# Patient Record
Sex: FEMALE | Race: WHITE | NOT HISPANIC OR LATINO | Employment: FULL TIME | URBAN - METROPOLITAN AREA
[De-identification: names, ages, dates, MRNs, and addresses within clinical notes are randomized per-mention and may not be internally consistent; named-entity substitution may affect disease eponyms.]

---

## 2017-01-05 ENCOUNTER — TELEPHONE (OUTPATIENT)
Dept: OBSTETRICS AND GYNECOLOGY | Facility: CLINIC | Age: 27
End: 2017-01-05

## 2017-01-05 NOTE — TELEPHONE ENCOUNTER
Dr. Baldwin pt's  calling, states that pt has been experiencing heartburn for the last two weeks and would like to know what she can take. Please call Anish back at [phone].

## 2017-01-05 NOTE — TELEPHONE ENCOUNTER
28 2/7 week OB c/o heartburn.  Pepcid is not helping, asking if she can take Ranitidine.  Reassured him that it was safe in pregnancy.

## 2017-01-18 ENCOUNTER — ROUTINE PRENATAL (OUTPATIENT)
Dept: OBSTETRICS AND GYNECOLOGY | Facility: CLINIC | Age: 27
End: 2017-01-18
Attending: OBSTETRICS & GYNECOLOGY
Payer: COMMERCIAL

## 2017-01-18 VITALS — SYSTOLIC BLOOD PRESSURE: 102 MMHG | DIASTOLIC BLOOD PRESSURE: 74 MMHG | BODY MASS INDEX: 43.5 KG/M2 | WEIGHT: 237.88 LBS

## 2017-01-18 DIAGNOSIS — Z34.80 SUPERVISION OF OTHER NORMAL PREGNANCY: Primary | ICD-10-CM

## 2017-01-18 PROCEDURE — 0502F SUBSEQUENT PRENATAL CARE: CPT | Mod: S$GLB,,, | Performed by: OBSTETRICS & GYNECOLOGY

## 2017-01-18 PROCEDURE — 99999 PR PBB SHADOW E&M-EST. PATIENT-LVL II: CPT | Mod: PBBFAC,,, | Performed by: OBSTETRICS & GYNECOLOGY

## 2017-01-18 RX ORDER — FAMOTIDINE 20 MG/1
20 TABLET, FILM COATED ORAL NIGHTLY PRN
Qty: 60 TABLET | Refills: 1 | Status: ON HOLD | OUTPATIENT
Start: 2017-01-18 | End: 2017-03-24 | Stop reason: HOSPADM

## 2017-01-18 NOTE — MR AVS SNAPSHOT
[facility]  [address]  [address]  [facility] [address]  Phone: [phone]  Fax: [phone]                  Jyotsna Moss   2017 9:30 AM   Routine Prenatal    Description:  Female : 1990   Provider:  Alida Baldwin MD   Department:  [facility]           Reason for Visit     Routine Prenatal Visit                To Do List           Future Appointments        Provider Department Dept Phone    2/3/2017 9:45 AM Alida Baldwin MD [facility] [phone]    2017 9:45 AM Alida Baldwin MD [facility] [phone]    2017 9:45 AM Alida Baldwin MD [facility] [phone]      Goals (5 Years of Data)     None       These Medications        Disp Refills Start End    famotidine (PEPCID) 20 MG tablet 60 tablet 1 2017    Take 1 tablet (20 mg total) by mouth nightly as needed for Heartburn. - Oral    Pharmacy: [address] #: [phone]         [facility] On Call     [facility] On Call Nurse Care Line -  Assistance  Registered nurses in the [facility] On Call Center provide clinical advisement, health education, appointment booking, and other advisory services.  Call for this free service at [phone].             Medications           Message regarding Medications     Verify the changes and/or additions to your medication regime listed below are the same as discussed with your clinician today.  If any of these changes or additions are incorrect, please notify your healthcare provider.        START taking these NEW medications        Refills    famotidine (PEPCID) 20 MG tablet 1    Sig: Take 1 tablet (20 mg total) by mouth nightly as needed for Heartburn.    Class: Normal    Route: Oral           Verify that the below list of medications is an accurate representation of the medications you are currently taking.  If none reported, the list  may be blank. If incorrect, please contact your healthcare provider. Carry this list with you in case of emergency.           Current Medications     doxylamine-pyridoxine (DICLEGIS) 10-10 mg TbEC Take 2 tablets by mouth once daily.    famotidine (PEPCID) 20 MG tablet Take 1 tablet (20 mg total) by mouth nightly as needed for Heartburn.           Clinical Reference Information           Prenatal Vitals     Enc. Date GA Prenatal Vitals Prenatal Pulse Pain Level Urine Albumin/Glucose Edema Presentation Dilation/Effacement/Station    1/18/17 30w1d 102/74 / 107.9 kg (237 lb 13.7 oz)           12/30/16 27w3d 102/60 / 107.8 kg (237 lb 10.5 oz) 27 cm / 140 / Present  0 Negative / Negative None / None      12/14/16 25w1d 116/78 / 106 kg (233 lb 11 oz) 25 cm / 134 / Present  0 Negative / Negative None / None / None      11/16/16 21w1d 114/76 / 105 kg (231 lb 9.5 oz)  /  / Present  0 Negative / Negative None / None / None      10/27/16 18w2d 106/72 / 104 kg (229 lb 4.5 oz)  / 148 / Present  0 Negative / Negative None / None / None      10/7/16 15w3d 100/70 / 102.4 kg (225 lb 12 oz)  / + / Absent  0 Negative / Negative None / None / None      9/7/16 11w1d 112/70 / 102.1 kg (225 lb 1.4 oz)  / 145 / Absent  0 Negative / Negative None / None      8/19/16 9w5d 122/80 / 103.1 kg (227 lb 6.5 oz)  / 140 / Absent  0 Negative / Negative None / None         Number of fetuses: 1       Vital Signs - Last Recorded  Most recent update: 1/18/2017  9:56 AM by Ely Fowler MA    BP Wt LMP BMI       102/74 107.9 kg (237 lb 13.7 oz) 06/12/2016 (Exact Date) 43.5 kg/m2       Allergies as of 1/18/2017     No Known Allergies      Immunizations Administered on Date of Encounter - 1/18/2017     None

## 2017-02-03 ENCOUNTER — ROUTINE PRENATAL (OUTPATIENT)
Dept: OBSTETRICS AND GYNECOLOGY | Facility: CLINIC | Age: 27
End: 2017-02-03
Attending: OBSTETRICS & GYNECOLOGY
Payer: COMMERCIAL

## 2017-02-03 VITALS
WEIGHT: 243.63 LBS | DIASTOLIC BLOOD PRESSURE: 68 MMHG | BODY MASS INDEX: 44.56 KG/M2 | SYSTOLIC BLOOD PRESSURE: 114 MMHG

## 2017-02-03 DIAGNOSIS — O26.841 SIZE OF FETUS INCONSISTENT WITH DATES IN FIRST TRIMESTER: Primary | ICD-10-CM

## 2017-02-03 PROCEDURE — 0502F SUBSEQUENT PRENATAL CARE: CPT | Mod: S$GLB,,, | Performed by: OBSTETRICS & GYNECOLOGY

## 2017-02-03 PROCEDURE — 99999 PR PBB SHADOW E&M-EST. PATIENT-LVL II: CPT | Mod: PBBFAC,,, | Performed by: OBSTETRICS & GYNECOLOGY

## 2017-02-03 NOTE — MR AVS SNAPSHOT
[facility]  [address]  [address]  [facility] [address]  Phone: [phone]  Fax: [phone]                  Jyotsna Moss   2/3/2017 9:45 AM   Routine Prenatal    Description:  Female : 1990   Provider:  Alida Baldwin MD   [facility]  [facility]           Reason for Visit     Routine Prenatal Visit           Diagnoses this Visit        Comments    Size of fetus inconsistent with dates in first trimester    -  Primary            To Do List           Future Appointments        Provider Department Dept Phone    2017 8:40 AM [facility], WOMEN'S ULTRASOUND [facility] [phone]    2017 9:45 AM Alida Baldwin MD [facility] [phone]    2017 9:45 AM Alida Baldwin MD [facility] [phone]    3/3/2017 10:00 AM Ailda Baldwin MD [facility] [phone]    3/10/2017 1:00 PM Alida Baldwin MD [facility] [phone]      Goals (5 Years of Data)     None      [facility] On Call     [facility] On Call Nurse [facility] -  Assistance  Registered nurses in the [facility] On Call Center provide clinical advisement, health education, appointment booking, and other advisory services.  Call for this free service at [phone].             Medications           Message regarding Medications     Verify the changes and/or additions to your medication regime listed below are the same as discussed with your clinician today.  If any of these changes or additions are incorrect, please notify your healthcare provider.             Verify that the below list of medications is an accurate representation of the medications you are currently taking.  If none reported, the list may be blank. If incorrect, please contact your healthcare provider. Carry this list with you in case of emergency.           Current Medications     doxylamine-pyridoxine (DICLEGIS) 10-10 mg TbEC Take 2 tablets by mouth once daily.     famotidine (PEPCID) 20 MG tablet Take 1 tablet (20 mg total) by mouth nightly as needed for Heartburn.           Clinical Reference Information           Prenatal Vitals     Enc. Date GA Prenatal Vitals Prenatal Pulse Pain Level Urine Albumin/Glucose Edema Presentation Dilation/Effacement/Station    2/3/17 32w3d 114/68 / 110.5 kg (243 lb 9.7 oz) 33 cm / 150  0 Negative / Negative       1/18/17 30w1d 102/74 / 107.9 kg (237 lb 13.7 oz) 32 cm / 145 / Present  0 Negative / Negative None / None      12/30/16 27w3d 102/60 / 107.8 kg (237 lb 10.5 oz) 27 cm / 140 / Present  0 Negative / Negative None / None      12/14/16 25w1d 116/78 / 106 kg (233 lb 11 oz) 25 cm / 134 / Present  0 Negative / Negative None / None / None      11/16/16 21w1d 114/76 / 105 kg (231 lb 9.5 oz)  /  / Present  0 Negative / Negative None / None / None      10/27/16 18w2d 106/72 / 104 kg (229 lb 4.5 oz)  / 148 / Present  0 Negative / Negative None / None / None      10/7/16 15w3d 100/70 / 102.4 kg (225 lb 12 oz)  / + / Absent  0 Negative / Negative None / None / None      9/7/16 11w1d 112/70 / 102.1 kg (225 lb 1.4 oz)  / 145 / Absent  0 Negative / Negative None / None      8/19/16 9w5d 122/80 / 103.1 kg (227 lb 6.5 oz)  / 140 / Absent  0 Negative / Negative None / None         Number of fetuses: 1       Your Vitals Were     BP Weight Last Period BMI       114/68 110.5 kg (243 lb 9.7 oz) 06/12/2016 (Exact Date) 44.56 kg/m2       Allergies as of 2/3/2017     No Known Allergies      Immunizations Administered on Date of Encounter - 2/3/2017     None      Orders Placed During Today's Visit     Future Labs/Procedures Expected by Expires    US OB/GYN Procedure ([facility]point) - Extended List - Future  As directed 2/3/2018      Language Assistance Services     ATTENTION: Language assistance services are available, free of charge. Please call [phone].      ATENCIÓN: Si habla español, tiene a spain disposición servicios gratuitos de asistencia lingüística.  Marlene nair [phone].     DENEEN Ý: N?u b?n nói Ti?ng Vi?t, có các d?ch v? h? tr? ngôn ng? mi?n phí dành cho b?n. G?i s? [phone].         [facility] complies with applicable Federal civil rights laws and does not discriminate on the basis of race, color, national origin, age, disability, or sex.

## 2017-02-17 ENCOUNTER — PROCEDURE VISIT (OUTPATIENT)
Dept: OBSTETRICS AND GYNECOLOGY | Facility: CLINIC | Age: 27
End: 2017-02-17
Payer: COMMERCIAL

## 2017-02-17 ENCOUNTER — ROUTINE PRENATAL (OUTPATIENT)
Dept: OBSTETRICS AND GYNECOLOGY | Facility: CLINIC | Age: 27
End: 2017-02-17
Attending: OBSTETRICS & GYNECOLOGY
Payer: COMMERCIAL

## 2017-02-17 VITALS
WEIGHT: 246.13 LBS | SYSTOLIC BLOOD PRESSURE: 112 MMHG | DIASTOLIC BLOOD PRESSURE: 68 MMHG | BODY MASS INDEX: 45.02 KG/M2

## 2017-02-17 DIAGNOSIS — Z36.85 ANTENATAL SCREENING FOR STREPTOCOCCUS B: Primary | ICD-10-CM

## 2017-02-17 DIAGNOSIS — O26.841 SIZE OF FETUS INCONSISTENT WITH DATES IN FIRST TRIMESTER: ICD-10-CM

## 2017-02-17 DIAGNOSIS — Z34.80 SUPERVISION OF OTHER NORMAL PREGNANCY: ICD-10-CM

## 2017-02-17 PROCEDURE — 76819 FETAL BIOPHYS PROFIL W/O NST: CPT | Mod: S$GLB,,, | Performed by: PEDIATRICS

## 2017-02-17 PROCEDURE — 87081 CULTURE SCREEN ONLY: CPT

## 2017-02-17 PROCEDURE — 99999 PR PBB SHADOW E&M-EST. PATIENT-LVL III: CPT | Mod: PBBFAC,,, | Performed by: OBSTETRICS & GYNECOLOGY

## 2017-02-17 PROCEDURE — 0502F SUBSEQUENT PRENATAL CARE: CPT | Mod: S$GLB,,, | Performed by: OBSTETRICS & GYNECOLOGY

## 2017-02-17 PROCEDURE — 76816 OB US FOLLOW-UP PER FETUS: CPT | Mod: S$GLB,,, | Performed by: PEDIATRICS

## 2017-02-17 NOTE — MR AVS SNAPSHOT
[facility]  [address]  [address]  [facility] [address]  Phone: [phone]  Fax: [phone]                  Jyotsna Moss   2017 9:45 AM   Routine Prenatal    Description:  Female : 1990   Provider:  Alida Baldwin MD   [facility]                To Do List           Future Appointments        Provider Department Dept Phone    2017 9:45 AM [facility] [phone]    3/3/2017 10:00 AM Alida Baldwin MD [facility] [phone]    3/10/2017 1:00 PM Alida Baldwin MD [facility] [phone]    3/17/2017 9:45 AM Antoinette Mckeon MD [facility] [phone]    3/24/2017 9:45 AM Alida Baldwin MD [facility] [phone]      Goals (5 Years of Data)     None      [facility] On Call     [facility] On Call Nurse [facility] -  Assistance  Registered nurses in the [facility] On Call Center provide clinical advisement, health education, appointment booking, and other advisory services.  Call for this free service at [phone].             Medications           Message regarding Medications     Verify the changes and/or additions to your medication regime listed below are the same as discussed with your clinician today.  If any of these changes or additions are incorrect, please notify your healthcare provider.             Verify that the below list of medications is an accurate representation of the medications you are currently taking.  If none reported, the list may be blank. If incorrect, please contact your healthcare provider. Carry this list with you in case of emergency.           Current Medications     doxylamine-pyridoxine (DICLEGIS) 10-10 mg TbEC Take 2 tablets by mouth once daily.    famotidine (PEPCID) 20 MG tablet Take 1 tablet (20 mg total) by mouth nightly as needed for Heartburn.           Clinical Reference Information           Prenatal Vitals      Enc. Date GA Prenatal Vitals Prenatal Pulse Pain Level Urine Albumin/Glucose Edema Presentation Dilation/Effacement/Station    2/17/17 34w3d 112/68 / 111.7 kg (246 lb 2.3 oz)    Negative / Negative       2/3/17 32w3d 114/68 / 110.5 kg (243 lb 9.7 oz) 34 cm / 150 / Present  0 Negative / Negative None / None      1/18/17 30w1d 102/74 / 107.9 kg (237 lb 13.7 oz) 32 cm / 145 / Present  0 Negative / Negative None / None      12/30/16 27w3d 102/60 / 107.8 kg (237 lb 10.5 oz) 27 cm / 140 / Present  0 Negative / Negative None / None      12/14/16 25w1d 116/78 / 106 kg (233 lb 11 oz) 25 cm / 134 / Present  0 Negative / Negative None / None / None      11/16/16 21w1d 114/76 / 105 kg (231 lb 9.5 oz)  /  / Present  0 Negative / Negative None / None / None      10/27/16 18w2d 106/72 / 104 kg (229 lb 4.5 oz)  / 148 / Present  0 Negative / Negative None / None / None      10/7/16 15w3d 100/70 / 102.4 kg (225 lb 12 oz)  / + / Absent  0 Negative / Negative None / None / None      9/7/16 11w1d 112/70 / 102.1 kg (225 lb 1.4 oz)  / 145 / Absent  0 Negative / Negative None / None      8/19/16 9w5d 122/80 / 103.1 kg (227 lb 6.5 oz)  / 140 / Absent  0 Negative / Negative None / None         Number of fetuses: 1       Your Vitals Were     BP Weight Last Period BMI       112/68 111.7 kg (246 lb 2.3 oz) 06/12/2016 (Exact Date) 45.02 kg/m2       Allergies as of 2/17/2017     No Known Allergies      Immunizations Administered on Date of Encounter - 2/17/2017     None      Language Assistance Services     ATTENTION: Language assistance services are available, free of charge. Please call [phone].      ATENCIÓN: Si habla merline, tiene a spain disposición servicios gratuitos de asistencia lingüística. Llame al [phone].     DENEEN Ý: N?u b?n nói Ti?ng Vi?t, có các d?ch v? h? tr? ngôn ng? mi?n phí dành cho b?n. G?i s? [phone].         [facility] complies with applicable Federal civil rights laws and does not discriminate  on the basis of race, color, national origin, age, disability, or sex.

## 2017-02-17 NOTE — PROGRESS NOTES
> 95 percent for baby   Mode of delivery discussed   Repeat growth at 37 weeks  Healthy eating discussed

## 2017-02-17 NOTE — MR AVS SNAPSHOT
[facility]  [address]  [address]  [facility] [address]  Phone: [phone]  Fax: [phone]                  Jyotsna Moss   2017 8:40 AM   Procedure visit    Description:  Female : 1990   Provider:  [facility], WOMEN'S ULTRASOUND   Department:  [facility]           Diagnoses this Visit        Comments    Size of fetus inconsistent with dates in first trimester                To Do List           Future Appointments        Provider Department Dept Phone    2017 9:45 AM Alida Baldwin MD [facility] [phone]    3/3/2017 10:00 AM Alida Baldwin MD [facility] [phone]    3/10/2017 1:00 PM Alida Baldwin MD [facility] [phone]    3/17/2017 9:45 AM Antoinette Mckeon MD [facility] [phone]    3/24/2017 9:45 AM lAida Baldwin MD [facility] [phone]      Goals (5 Years of Data)     None      [facility] On Call     [facility] On [facility] Nurse [facility] -  Assistance  Registered nurses in the [facility] On Call Center provide clinical advisement, health education, appointment booking, and other advisory services.  Call for this free service at [phone].             Medications           Message regarding Medications     Verify the changes and/or additions to your medication regime listed below are the same as discussed with your clinician today.  If any of these changes or additions are incorrect, please notify your healthcare provider.             Verify that the below list of medications is an accurate representation of the medications you are currently taking.  If none reported, the list may be blank. If incorrect, please contact your healthcare provider. Carry this list with you in case of emergency.           Current Medications     doxylamine-pyridoxine (DICLEGIS) 10-10 mg TbEC Take 2 tablets by mouth once daily.    famotidine (PEPCID) 20 MG tablet Take 1 tablet (20 mg total)  by mouth nightly as needed for Heartburn.           Clinical Reference Information           Prenatal Vitals     Enc. Date GA Prenatal Vitals Prenatal Pulse Pain Level Urine Albumin/Glucose Edema Presentation Dilation/Effacement/Station    2/17/17 34w3d 112/68 / 111.7 kg (246 lb 2.3 oz)    Negative / Negative       2/3/17 32w3d 114/68 / 110.5 kg (243 lb 9.7 oz) 34 cm / 150 / Present  0 Negative / Negative None / None      1/18/17 30w1d 102/74 / 107.9 kg (237 lb 13.7 oz) 32 cm / 145 / Present  0 Negative / Negative None / None      12/30/16 27w3d 102/60 / 107.8 kg (237 lb 10.5 oz) 27 cm / 140 / Present  0 Negative / Negative None / None      12/14/16 25w1d 116/78 / 106 kg (233 lb 11 oz) 25 cm / 134 / Present  0 Negative / Negative None / None / None      11/16/16 21w1d 114/76 / 105 kg (231 lb 9.5 oz)  /  / Present  0 Negative / Negative None / None / None      10/27/16 18w2d 106/72 / 104 kg (229 lb 4.5 oz)  / 148 / Present  0 Negative / Negative None / None / None      10/7/16 15w3d 100/70 / 102.4 kg (225 lb 12 oz)  / + / Absent  0 Negative / Negative None / None / None      9/7/16 11w1d 112/70 / 102.1 kg (225 lb 1.4 oz)  / 145 / Absent  0 Negative / Negative None / None      8/19/16 9w5d 122/80 / 103.1 kg (227 lb 6.5 oz)  / 140 / Absent  0 Negative / Negative None / None         Number of fetuses: 1       Your Vitals Were     Last Period                   06/12/2016 (Exact Date)           Allergies as of 2/17/2017     No Known Allergies      Immunizations Administered on Date of Encounter - 2/17/2017     None      Orders Placed During Today's Visit      Normal Orders This Visit    US OB/GYN Procedure (Viewpoint) - Extended List - Future       Language Assistance Services     ATTENTION: Language assistance services are available, free of charge. Please call [phone].      ATENCIÓN: Si habla español, tiene a spain disposición servicios gratuitos de asistencia lingüística. Llame al [phone].     [facility] Ý: N?u b?n  nói Ti?ng Vi?t, có các d?ch v? h? tr? ngôn ng? mi?n phí dành cho b?n. G?i s? [phone].         [facility] complies with applicable Federal civil rights laws and does not discriminate on the basis of race, color, national origin, age, disability, or sex.

## 2017-02-17 NOTE — PROCEDURES
Procedures       Indication: follow-up for fetal growth (MANISH AMADOR).   Maternal age (26 years).   ____________________________________________________________________________  History: Age: 26 years. : 2 Para: 1. LMP not sure.   Obstetric History: Mode of last delivery: Vaginal Delivery @ 40 weeks.  ____________________________________________________________________________  Dating:    LMP: 16 EDC: 17 GA by LMP: 35w5d  Previous Scan on: 08/10/16 EDC: 17 GA by prev. scan: 34w3d  Current Scan on: 17 EDC: 17 GA by current scan: 36w6d      Best Overall Assessment: 17 EDC: 17 Assessed GA: 34w3d  The calculation of the gestational age by current scan was based on BPD, HC, AC and FL.  The Best Overall Assessment is based on the ultrasound examination on 08/10/16.  ____________________________________________________________________________  General Evaluation:    Fetal heart activity: present. Fetal heart rate: 152 bpm.   Presentation: unstable lie.   Fetal movement: visible.   Amniotic Fluid: normal. Maximal vertical pocket 7.0 cm.   Cord: 3 vessels.   Placenta: anterior.     ____________________________________________________________________________  Growth Scan:  Siegel gestation.      Biometry:    BPD 87.9 mm 79th% 35w4d (34w3d to 36w4d)  .0 mm >95th% 39w3d (36w5d to 42w1d)  .0 mm >95th% 38w0d (37w0d to 39w0d)  FL 67.0 mm 42nd% 34w3d (31w4d to 37w3d)  CM 6.5 mm 24th%  EFW (lbs/oz) 6 lbs 13 ozs  EFW (g) 3098 g  >95th%       Fetal Anatomy:    Visualized with normal appearance: head, brain, chest, gastrointestinal tract, kidneys, bladder.  Not examined: neck, skin.  Face: profile sub-opt, nose sub-opt, lip sub-opt. Sub-opt today but prev seen wnl.  Spine: sub-opt today but prev seen wnl.  Heart: Visualized and normal appearance: four-chamber view.   Angle: to the fetal left. Four-chamber view: septum is sub-opt, prev seen wnl. Left outflow tract: sub-opt,  prev seen wnl. Right outflow tract: sub-opt, prev seen wnl. Aortic arch: Not ascertained.   Aorta suboptimal but prev seen wnl.  Abdominal Wall: Sub-opt today but prev seen wnl.  Genitalia: sub-opt today but prev seen boy.   Extremities: 4 limbs. Previously seen plantar surface of the feet wnl, previously seen open hands.      Summary of Ultrasound Findings:  Transabdominal US. U/S machine: [id number] [place] 520. U/S view: limited by late gestational age.       Impression: The fetal size is greater than dates suggest.    Comment: No fetal abnormalities are seen.    ____________________________________________________________________________  Fetal Wellbeing Assessment:    Amniotic fluid: normal. MVP: 7.0 cm.     Biophysical Profile: Fetal body movements: normal (2), Fetal tone: normal (2), Fetal breathing movements: normal (2), Amniotic fluid volume: normal (2). Score 8 / 8.     Summary: BPP normal at 8 of 8.  [facility]    ____________________________________________________________________________  Report Summary:      Impression:     Limited anatomy was negative.  No anomalies seen.  AFV normal.     The biometry obtained on today's scan is consistent with a macrosomic growth profile.     Recommendations:     Suggest repeat scan as you feel clinically indicated.

## 2017-02-20 LAB — BACTERIA SPEC AEROBE CULT: NORMAL

## 2017-02-23 ENCOUNTER — ROUTINE PRENATAL (OUTPATIENT)
Dept: OBSTETRICS AND GYNECOLOGY | Facility: CLINIC | Age: 27
End: 2017-02-23
Attending: OBSTETRICS & GYNECOLOGY
Payer: COMMERCIAL

## 2017-02-23 ENCOUNTER — LAB VISIT (OUTPATIENT)
Dept: LAB | Facility: HOSPITAL | Age: 27
End: 2017-02-23
Payer: COMMERCIAL

## 2017-02-23 VITALS — DIASTOLIC BLOOD PRESSURE: 70 MMHG | SYSTOLIC BLOOD PRESSURE: 122 MMHG | WEIGHT: 244.94 LBS | BODY MASS INDEX: 44.8 KG/M2

## 2017-02-23 DIAGNOSIS — Z3A.35 35 WEEKS GESTATION OF PREGNANCY: ICD-10-CM

## 2017-02-23 DIAGNOSIS — O99.213 OBESITY IN PREGNANCY, ANTEPARTUM, THIRD TRIMESTER: ICD-10-CM

## 2017-02-23 DIAGNOSIS — Z34.90 GRAVIDA 2, CURRENTLY PREGNANT: ICD-10-CM

## 2017-02-23 DIAGNOSIS — O36.63X0: ICD-10-CM

## 2017-02-23 DIAGNOSIS — Z3A.35 35 WEEKS GESTATION OF PREGNANCY: Primary | ICD-10-CM

## 2017-02-23 PROBLEM — O36.60X0 EXCESSIVE FETAL GROWTH, LARGE FOR DATES, ANTEPARTUM: Status: ACTIVE | Noted: 2017-02-23

## 2017-02-23 LAB
ERYTHROCYTE [DISTWIDTH] IN BLOOD BY AUTOMATED COUNT: 13.1 %
HCT VFR BLD AUTO: 34.4 %
HGB BLD-MCNC: 11.4 G/DL
MCH RBC QN AUTO: 30.2 PG
MCHC RBC AUTO-ENTMCNC: 33.1 %
MCV RBC AUTO: 91 FL
PLATELET # BLD AUTO: 219 K/UL
PMV BLD AUTO: 10.3 FL
RBC # BLD AUTO: 3.78 M/UL
WBC # BLD AUTO: 8.36 K/UL

## 2017-02-23 PROCEDURE — 0502F SUBSEQUENT PRENATAL CARE: CPT | Mod: S$GLB,,, | Performed by: NURSE PRACTITIONER

## 2017-02-23 PROCEDURE — 86703 HIV-1/HIV-2 1 RESULT ANTBDY: CPT

## 2017-02-23 PROCEDURE — 86592 SYPHILIS TEST NON-TREP QUAL: CPT

## 2017-02-23 PROCEDURE — 99999 PR PBB SHADOW E&M-EST. PATIENT-LVL III: CPT | Mod: PBBFAC,,, | Performed by: NURSE PRACTITIONER

## 2017-02-23 PROCEDURE — 85027 COMPLETE CBC AUTOMATED: CPT

## 2017-02-23 NOTE — MR AVS SNAPSHOT
[facility]  [address]  [place] [address]  Phone: [phone]  Fax: [phone]                  Jyotsna Moss   2017 10:20 AM   Routine Prenatal    Description:  Female : 1990   Provider:  Juanita Bob NP   Department:  [facility]           Reason for Visit     Routine Prenatal Visit           Diagnoses this Visit        Comments    35 weeks gestation of pregnancy    -  Primary      2, currently pregnant                To Do List           Future Appointments        Provider Department Dept Phone    2017 11:10 AM [facility], [facility]     3/3/2017 10:00 AM Alida Baldwin MD General acute hospital [phone]    3/10/2017 1:00 PM Alida Baldwin MD General acute hospital [phone]    3/17/2017 9:45 AM Antoinette Mckeon MD General acute hospital [phone]    3/24/2017 9:45 AM Alida Baldwin MD General acute hospital [phone]      Goals (5 Years of Data)     None      [facility] On Call     [facility] On Call Nurse [facility] -  Assistance  Registered nurses in the [facility] On Call Center provide clinical advisement, health education, appointment booking, and other advisory services.  Call for this free service at [phone].             Medications           Message regarding Medications     Verify the changes and/or additions to your medication regime listed below are the same as discussed with your clinician today.  If any of these changes or additions are incorrect, please notify your healthcare provider.             Verify that the below list of medications is an accurate representation of the medications you are currently taking.  If none reported, the list may be blank. If incorrect, please contact your healthcare provider. Carry this list with you in case of emergency.           Current Medications     doxylamine-pyridoxine (DICLEGIS) 10-10 mg TbEC Take 2 tablets by mouth once  daily.    famotidine (PEPCID) 20 MG tablet Take 1 tablet (20 mg total) by mouth nightly as needed for Heartburn.           Clinical Reference Information           Prenatal Vitals     Enc. Date GA Prenatal Vitals Prenatal Pulse Pain Level Urine Albumin/Glucose Edema Presentation Dilation/Effacement/Station    2/23/17 35w2d 122/70 / 111.1 kg (244 lb 14.9 oz)  /  / Present  0  None / None      2/17/17 34w3d 112/68 / 111.7 kg (246 lb 2.3 oz) 37 cm / 151 / Present  0 Negative / Negative None / None  0    2/3/17 32w3d 114/68 / 110.5 kg (243 lb 9.7 oz) 34 cm / 150 / Present  0 Negative / Negative None / None      1/18/17 30w1d 102/74 / 107.9 kg (237 lb 13.7 oz) 32 cm / 145 / Present  0 Negative / Negative None / None      12/30/16 27w3d 102/60 / 107.8 kg (237 lb 10.5 oz) 27 cm / 140 / Present  0 Negative / Negative None / None      12/14/16 25w1d 116/78 / 106 kg (233 lb 11 oz) 25 cm / 134 / Present  0 Negative / Negative None / None / None      11/16/16 21w1d 114/76 / 105 kg (231 lb 9.5 oz)  /  / Present  0 Negative / Negative None / None / None      10/27/16 18w2d 106/72 / 104 kg (229 lb 4.5 oz)  / 148 / Present  0 Negative / Negative None / None / None      10/7/16 15w3d 100/70 / 102.4 kg (225 lb 12 oz)  / + / Absent  0 Negative / Negative None / None / None      9/7/16 11w1d 112/70 / 102.1 kg (225 lb 1.4 oz)  / 145 / Absent  0 Negative / Negative None / None      8/19/16 9w5d 122/80 / 103.1 kg (227 lb 6.5 oz)  / 140 / Absent  0 Negative / Negative None / None         Number of fetuses: 1       Your Vitals Were     BP Weight Last Period BMI       122/70 111.1 kg (244 lb 14.9 oz) 06/12/2016 (Exact Date) 44.8 kg/m2       Allergies as of 2/23/2017     No Known Allergies      Immunizations Administered on Date of Encounter - 2/23/2017     None      Orders Placed During Today's Visit     Future Labs/Procedures Expected by Expires    CBC Without Differential  2/23/2017 4/24/2018    HIV-1 and HIV-2 antibodies  2/23/2017  2018    RPR  2017      Instructions    Topic  General Pregnancy Information Recommended   (Unless Otherwise Contraindicated Or Restrictions Given To You By Your OB Doctor)      1. Anticipated course of prenatal care       Visits: will be Every 4 wks until 28 weeks, then every 2 weeks until 36 weeks, and then weekly until delivery.    Urine will be collected at each Obstetric visit        2. Nutrition and weight gain     Daily pre-peg vitamin (recommend taking at night)    Additional 300 calories needed daily   No Sushi, hotdogs, unpasteurized products (milk/cheeses). No large fish such as: shark, kaleb mackerel, tile, sword fish    Incorporate 12 ounces of smaller seafood/week and no more than 6oz of albacore tuna    Caffeine: 200 mg/day or 2 cups of caffeine/day    Weight gain recommendations are based off of BMI before pregnancy. Generally patients who with normal weight prior pregnancy it is recommended 25-35 pounds of weight gain during the pregnancy with an estimated weekly gain of 1 pound/wk in 2nd and 3rd Trimester.    4. Sexual Activity   Sexual activity is okay unless you are put on restrictions by your provider. I recommend urinating after intercourse    5. Exercise   Generally pre-pregnancy routine is okay to continue    Drink plenty fluids for hydration    Stop any activity that causes heavy cramping like a period or bright red bleeding and contact your provider   No extreme or contact sports    No exercise on your back for an extended period of time after 20 weeks    6. Hot Tub/Saunas   Avoid hot tubes and saunas    7. Vaccines   Influenza vaccine is recommended by CDC during flu season    Tdap (pertussis or whopping cough) recommended each pregnancy between 27 and 36 weeks    Tdap booster recommended for family and other planned direct caregivers    8. Water   Water is an important nutrient in a good diet. However, it cannot be stressed enough that during  pregnancy water is essential. The body has increased circulation through blood vessels, and without a large increase in water, pregnant women will be dehydrated. It plays an important role in decreasing constipation, preventing  contractions, decreasing swelling, and preventing dizziness. We recommend that you drink 8-10 glasses of water per day.    9. Smoking/Alcohol/Illicit Drug Use   No safe Level    Can lead to problems with pregnancy    Growth of the developing fetus     labor (delivery before 37 weeks)     rupture of the membranes (water breaking before 37 weeks)    Premature separation of the placenta (which may cause bleeding)    American College of Obstetricians and Gynecologists endorses abstinence    Can lead to babies with disabilities    10. Environmental or work hazards   Unless otherwise restricted you may continue work throughout the pregnancy    Notify your provider of any work hazards or chemical exposure concerns   11. Travel     Safe to travel up to 35 weeks    Continue to wear a seatbelt and airbags are still recommended    Drink plenty fluids    Blood clots are a concern during pregnancy with long travel. Recommend compression stockings and moving around at least every 2 hours and staying hydrated.    12. Domestic Violence     Please notify office immediately of any concerns or violence so that we can help direct you to assistance needed    [place] [employer]: [phone]    13. Childbirth classes     List of Childbirth classes from [facility] is available    14. Selecting a Pediatrician   Selecting a pediatrician before delivery is recommended   You can interview pediatricians before delivery    15. Fetal Monitoring     A simple test of your babys well-being is a kick count. After 26 weeks, fetal motion of any kind should be monitored. Further discussion at that time   16.  Labor Signs     Water break, leaking  fluids from Vagina prior 37 weeks   Regular contractions, Contractions that are more than 5-6/hour, getting stronger and painful with lower back pain, does not go away with rest and fluids    17. Postpartum Family Planning     Multiple options available from short term methods to long term reversible and irreversible methods    Discuss with provider as you get closer to delivery    18. Breastfeeding     Classes offered at [facility] and it is recommended to take a class    19. Lifting  In 2013, the National [place] for Occupational Safety and Health (NIOSH) published clinical guidelines for occupational lifting in uncomplicated pregnancies. The recommended weight limits are based on gestational age, intermittent versus repetitive lifting, time (hours/day) spent lifting, and lifting height from floor and distance in 3 front of body. In this guideline, the maximum permissible weight for a woman less than 20 weeks of gestation performing infrequent lifting is 36 pounds (16 kgs) and the maximum permissible weight at ?20 weeks is 26 pounds (12 kgs). For repetitive lifting ?1 hour/day, the maximum weights in the first and second half of pregnancy are 18 pounds (8 kgs) and 13 pounds (6 kgs), respectively, and for repetitive lifting <1 hour/day, the maximum weights are 30 pounds (14 kgs) and 22 pounds (10 kgs), respectively. Although not based on high quality evidence, these guidelines are a reasonable reference for counseling pregnant women     20. Scheduling and Provider Availability     Your Obstetric Doctor is usually here weekly but not every day. We recommend you make 3-4 advanced appointments at a time to accommodate your personal needs and work/school obligations.    We ask that you come 15 minutes prior your scheduled appointment.    For same day appointments (not routine appointments) there is a Nurse Practitioner or another obstetric provider available. Please let the  aware you are an OB patient  requesting a same day appointment.             Language Assistance Services     ATTENTION: Language assistance services are available, free of charge. Please call [phone].      ATENCIÓN: Si habla merline, tiene a spain disposición servicios gratuitos de asistencia lingüística. Llame al [phone].     CHÚ Ý: N?u b?n nói Ti?ng Vi?t, có các d?ch v? h? tr? ngôn ng? mi?n phí dành cho b?n. G?i s? [phone].         [facility] complies with applicable Federal civil rights laws and does not discriminate on the basis of race, color, national origin, age, disability, or sex.

## 2017-02-23 NOTE — PATIENT INSTRUCTIONS
Topic  General Pregnancy Information Recommended   (Unless Otherwise Contraindicated Or Restrictions Given To You By Your OB Doctor)      1. Anticipated course of prenatal care       Visits: will be Every 4 wks until 28 weeks, then every 2 weeks until 36 weeks, and then weekly until delivery.    Urine will be collected at each Obstetric visit        2. Nutrition and weight gain     Daily pre-peg vitamin (recommend taking at night)    Additional 300 calories needed daily   No Sushi, hotdogs, unpasteurized products (milk/cheeses). No large fish such as: shark, kaleb mackerel, tile, sword fish    Incorporate 12 ounces of smaller seafood/week and no more than 6oz of albacore tuna    Caffeine: 200 mg/day or 2 cups of caffeine/day    Weight gain recommendations are based off of BMI before pregnancy. Generally patients who with normal weight prior pregnancy it is recommended 25-35 pounds of weight gain during the pregnancy with an estimated weekly gain of 1 pound/wk in 2nd and 3rd Trimester.    4. Sexual Activity   Sexual activity is okay unless you are put on restrictions by your provider. I recommend urinating after intercourse    5. Exercise   Generally pre-pregnancy routine is okay to continue    Drink plenty fluids for hydration    Stop any activity that causes heavy cramping like a period or bright red bleeding and contact your provider   No extreme or contact sports    No exercise on your back for an extended period of time after 20 weeks    6. Hot Tub/Saunas   Avoid hot tubes and saunas    7. Vaccines   Influenza vaccine is recommended by CDC during flu season    Tdap (pertussis or whopping cough) recommended each pregnancy between 27 and 36 weeks    Tdap booster recommended for family and other planned direct caregivers    8. Water   Water is an important nutrient in a good diet. However, it cannot be stressed enough that during pregnancy water is essential. The body has increased circulation  through blood vessels, and without a large increase in water, pregnant women will be dehydrated. It plays an important role in decreasing constipation, preventing  contractions, decreasing swelling, and preventing dizziness. We recommend that you drink 8-10 glasses of water per day.    9. Smoking/Alcohol/Illicit Drug Use   No safe Level    Can lead to problems with pregnancy    Growth of the developing fetus     labor (delivery before 37 weeks)     rupture of the membranes (water breaking before 37 weeks)    Premature separation of the placenta (which may cause bleeding)    American College of Obstetricians and Gynecologists endorses abstinence    Can lead to babies with disabilities    10. Environmental or work hazards   Unless otherwise restricted you may continue work throughout the pregnancy    Notify your provider of any work hazards or chemical exposure concerns   11. Travel     Safe to travel up to 35 weeks    Continue to wear a seatbelt and airbags are still recommended    Drink plenty fluids    Blood clots are a concern during pregnancy with long travel. Recommend compression stockings and moving around at least every 2 hours and staying hydrated.    12. Domestic Violence     Please notify office immediately of any concerns or violence so that we can help direct you to assistance needed    [place] [employer]: [phone]    13. Childbirth classes     List of Childbirth classes from [facility] is available    14. Selecting a Pediatrician   Selecting a pediatrician before delivery is recommended   You can interview pediatricians before delivery    15. Fetal Monitoring     A simple test of your babys well-being is a kick count. After 26 weeks, fetal motion of any kind should be monitored. Further discussion at that time   16.  Labor Signs     Water break, leaking fluids from Vagina prior 37 weeks   Regular contractions,  Contractions that are more than 5-6/hour, getting stronger and painful with lower back pain, does not go away with rest and fluids    17. Postpartum Family Planning     Multiple options available from short term methods to long term reversible and irreversible methods    Discuss with provider as you get closer to delivery    18. Breastfeeding     Classes offered at [facility] and it is recommended to take a class    19. Lifting  In 2013, the National [place] for Occupational Safety and Health (NIOSH) published clinical guidelines for occupational lifting in uncomplicated pregnancies. The recommended weight limits are based on gestational age, intermittent versus repetitive lifting, time (hours/day) spent lifting, and lifting height from floor and distance in 3 front of body. In this guideline, the maximum permissible weight for a woman less than 20 weeks of gestation performing infrequent lifting is 36 pounds (16 kgs) and the maximum permissible weight at ?20 weeks is 26 pounds (12 kgs). For repetitive lifting ?1 hour/day, the maximum weights in the first and second half of pregnancy are 18 pounds (8 kgs) and 13 pounds (6 kgs), respectively, and for repetitive lifting <1 hour/day, the maximum weights are 30 pounds (14 kgs) and 22 pounds (10 kgs), respectively. Although not based on high quality evidence, these guidelines are a reasonable reference for counseling pregnant women     20. Scheduling and Provider Availability     Your Obstetric Doctor is usually here weekly but not every day. We recommend you make 3-4 advanced appointments at a time to accommodate your personal needs and work/school obligations.    We ask that you come 15 minutes prior your scheduled appointment.    For same day appointments (not routine appointments) there is a Nurse Practitioner or another obstetric provider available. Please let the  aware you are an OB patient requesting a same day appointment.

## 2017-02-23 NOTE — PROGRESS NOTES
Chief Complaint: Third Trimester Obstetric Visit    HPI: Jyotsna Moss is a 26 y.o., , at 35w2d wks here for a routine prenatal visit. She denies any vaginal bleeding, leaking fluids, abnormal vaginal discharge--although she feels she may have passed some of a mucus plug)  or GI complaints. Edema not reported by patient. David Waller contractions are reported by patient. Fetal movement detected at this time. She is currently taking a daily prenatal vitamin and no other changes in medications reported at this time.     Physical Exam:   FHT: 152  FH: 41 (pt with repeat scan at 37%)  Cervix: Closed  Visit Vitals    /70    Wt 111.1 kg (244 lb 14.9 oz)    LMP 2016 (Exact Date)    BMI 44.8 kg/m2     Assessment/Plan  1. Third Trimester Pregnancy Routine Visit--patient here for routine prenatal visit doing well. Continue daily prenatal vitamin, second trimester ACOG education topics discussed and handout provided. 3rd T labs today  2. GBS screening -- screening completed and neg results discussed with pt  3. Pediatrician and Infant Feeding--discussed breastfeeding had good success with previous baby. Patient has chosen Dr. Todd Taylor for a pediatrician  4. Anesthesia discussed with patient and patient desires for Epidural and to discuss decision with OBMD at next visit  5. Fetal Movement--discussed and handout provided to patient on monitoring fetal movement  6. Post-partum Contraception Method--discussed options with patient and patient desires think about options is comfortable with NFP and lacatation as measure at this time   7. Labor--discussed real vs false labor and when to report to L&D or call office  8. PTL--discussed with patient s/s of PTL    RTC prn as schedule with OBMD

## 2017-02-24 LAB
HIV 1+2 AB+HIV1 P24 AG SERPL QL IA: NEGATIVE
RPR SER QL: NORMAL

## 2017-03-03 ENCOUNTER — ROUTINE PRENATAL (OUTPATIENT)
Dept: OBSTETRICS AND GYNECOLOGY | Facility: CLINIC | Age: 27
End: 2017-03-03
Attending: OBSTETRICS & GYNECOLOGY
Payer: COMMERCIAL

## 2017-03-03 VITALS
DIASTOLIC BLOOD PRESSURE: 70 MMHG | SYSTOLIC BLOOD PRESSURE: 118 MMHG | WEIGHT: 246.38 LBS | BODY MASS INDEX: 45.06 KG/M2

## 2017-03-03 DIAGNOSIS — Z36.89 ENCOUNTER FOR ULTRASOUND TO CHECK FETAL GROWTH: Primary | ICD-10-CM

## 2017-03-03 DIAGNOSIS — Z34.80 SUPERVISION OF OTHER NORMAL PREGNANCY: ICD-10-CM

## 2017-03-03 DIAGNOSIS — Z23 NEED FOR TDAP VACCINATION: Primary | ICD-10-CM

## 2017-03-03 DIAGNOSIS — Z23 NEED FOR TDAP VACCINATION: ICD-10-CM

## 2017-03-03 PROCEDURE — 90715 TDAP VACCINE 7 YRS/> IM: CPT | Mod: S$GLB,,, | Performed by: OBSTETRICS & GYNECOLOGY

## 2017-03-03 PROCEDURE — 90471 IMMUNIZATION ADMIN: CPT | Mod: S$GLB,,, | Performed by: OBSTETRICS & GYNECOLOGY

## 2017-03-03 PROCEDURE — 0502F SUBSEQUENT PRENATAL CARE: CPT | Mod: S$GLB,,, | Performed by: OBSTETRICS & GYNECOLOGY

## 2017-03-03 PROCEDURE — 99999 PR PBB SHADOW E&M-EST. PATIENT-LVL II: CPT | Mod: PBBFAC,,, | Performed by: OBSTETRICS & GYNECOLOGY

## 2017-03-03 NOTE — MR AVS SNAPSHOT
[facility]  [address]  [address]  [facility] [address]  Phone: [phone]  Fax: [phone]                  Jyotsna Moss   3/3/2017 10:00 AM   Routine Prenatal    Description:  Female : 1990   Provider:  Alida Baldwin MD   Department:  [facility]           Reason for Visit     Routine Prenatal Visit           Diagnoses this Visit        Comments    Encounter for ultrasound to check fetal growth    -  Primary     Need for Tdap vaccination                To Do List           Future Appointments        Provider Department Dept Phone    3/10/2017 11:30 AM [facility] WOMEN'S ULTRASOUND [facility] [phone]    3/10/2017 1:00 PM Alida Baldwin MD [facility] [phone]    3/17/2017 9:45 AM Antoinette Mckeon MD [facility] [phone]    3/24/2017 9:45 AM Alida Baldwin MD [facility] [phone]      Goals (5 Years of Data)     None      [facility] On Call     [facility] On Call Nurse [facility] -  Assistance  Registered nurses in the [facility] On Call Center provide clinical advisement, health education, appointment booking, and other advisory services.  Call for this free service at [phone].             Medications           Message regarding Medications     Verify the changes and/or additions to your medication regime listed below are the same as discussed with your clinician today.  If any of these changes or additions are incorrect, please notify your healthcare provider.             Verify that the below list of medications is an accurate representation of the medications you are currently taking.  If none reported, the list may be blank. If incorrect, please contact your healthcare provider. Carry this list with you in case of emergency.           Current Medications     doxylamine-pyridoxine (DICLEGIS) 10-10 mg TbEC Take 2 tablets by mouth once daily.    famotidine (PEPCID) 20 MG tablet Take 1 tablet  (20 mg total) by mouth nightly as needed for Heartburn.           Clinical Reference Information           Prenatal Vitals     Enc. Date GA Prenatal Vitals Prenatal Pulse Pain Level Urine Albumin/Glucose Edema Presentation Dilation/Effacement/Station    3/3/17 36w3d 118/70 / 111.7 kg (246 lb 5.8 oz)  /  / Present   Negative / Negative       2/23/17 35w2d 122/70 / 111.1 kg (244 lb 14.9 oz) 40 cm / 152 / Present  0  None / None      2/17/17 34w3d 112/68 / 111.7 kg (246 lb 2.3 oz) 37 cm / 151 / Present  0 Negative / Negative None / None  0    2/3/17 32w3d 114/68 / 110.5 kg (243 lb 9.7 oz) 34 cm / 150 / Present  0 Negative / Negative None / None      1/18/17 30w1d 102/74 / 107.9 kg (237 lb 13.7 oz) 32 cm / 145 / Present  0 Negative / Negative None / None      12/30/16 27w3d 102/60 / 107.8 kg (237 lb 10.5 oz) 27 cm / 140 / Present  0 Negative / Negative None / None      12/14/16 25w1d 116/78 / 106 kg (233 lb 11 oz) 25 cm / 134 / Present  0 Negative / Negative None / None / None      11/16/16 21w1d 114/76 / 105 kg (231 lb 9.5 oz)  /  / Present  0 Negative / Negative None / None / None      10/27/16 18w2d 106/72 / 104 kg (229 lb 4.5 oz)  / 148 / Present  0 Negative / Negative None / None / None      10/7/16 15w3d 100/70 / 102.4 kg (225 lb 12 oz)  / + / Absent  0 Negative / Negative None / None / None      9/7/16 11w1d 112/70 / 102.1 kg (225 lb 1.4 oz)  / 145 / Absent  0 Negative / Negative None / None      8/19/16 9w5d 122/80 / 103.1 kg (227 lb 6.5 oz)  / 140 / Absent  0 Negative / Negative None / None         Number of fetuses: 1       Your Vitals Were     BP Weight Last Period BMI       118/70 111.7 kg (246 lb 5.8 oz) 06/12/2016 (Exact Date) 45.06 kg/m2       Allergies as of 3/3/2017     No Known Allergies      Immunizations Administered on Date of Encounter - 3/3/2017     Name Date Dose VIS Date Route    TDAP 3/3/2017 0.5 mL 2/24/2015 Intramuscular      Orders Placed During Today's Visit      Normal Orders This Visit     Tdap Vaccine     Future Labs/Procedures Expected by Expires    US OB/GYN Procedure (Viewpoint) - Extended List - Future  As directed 3/3/2018      Language Assistance Services     ATTENTION: Language assistance services are available, free of charge. Please call [phone].      ATENCIÓN: Si habla merline, tiene a spain disposición servicios gratuitos de asistencia lingüística. Llame al [phone].     CHÚ Ý: N?u b?n nói Ti?ng Vi?t, có các d?ch v? h? tr? ngôn ng? mi?n phí dành cho b?n. G?i s? [phone].         [facility] complies with applicable Federal civil rights laws and does not discriminate on the basis of race, color, national origin, age, disability, or sex.

## 2017-03-03 NOTE — MR AVS SNAPSHOT
[facility]  [address]  [address]  [facility] [address]  Phone: [phone]  Fax: [phone]                  Jyotsna Moss   3/3/2017 11:00 AM   Clinical Support    Description:  Female : 1990   Provider:  NURSE NELSON, [facility]   Department:  [facility]           Reason for Visit     Injections           Diagnoses this Visit        Comments    Need for Tdap vaccination    -  Primary            To Do List           Future Appointments        Provider Department Dept Phone    3/10/2017 11:30 AM [facility], WOMEN'S ULTRASOUND [facility] [phone]    3/10/2017 1:00 PM Alida Baldwin MD [facility] [phone]    3/17/2017 9:45 AM Antoinette Mckeon MD [facility] [phone]    3/24/2017 9:45 AM Alida Baldwin MD [facility] [phone]      Goals (5 Years of Data)     None      [facility]     [facility] Line -  Assistance  Registered nurses in the [facility] provide clinical advisement, health education, appointment booking, and other advisory services.  Call for this free service at [phone].             Medications           Message regarding Medications     Verify the changes and/or additions to your medication regime listed below are the same as discussed with your clinician today.  If any of these changes or additions are incorrect, please notify your healthcare provider.             Verify that the below list of medications is an accurate representation of the medications you are currently taking.  If none reported, the list may be blank. If incorrect, please contact your healthcare provider. Carry this list with you in case of emergency.           Current Medications     doxylamine-pyridoxine (DICLEGIS) 10-10 mg TbEC Take 2 tablets by mouth once daily.    famotidine (PEPCID) 20 MG tablet Take 1 tablet (20 mg total) by mouth nightly as needed for  Heartburn.           Clinical Reference Information           Prenatal Vitals     Enc. Date GA Prenatal Vitals Prenatal Pulse Pain Level Urine Albumin/Glucose Edema Presentation Dilation/Effacement/Station    3/3/17 36w3d 118/70 / 111.7 kg (246 lb 5.8 oz)  /  / Present   Negative / Negative       2/23/17 35w2d 122/70 / 111.1 kg (244 lb 14.9 oz) 40 cm / 152 / Present  0  None / None      2/17/17 34w3d 112/68 / 111.7 kg (246 lb 2.3 oz) 37 cm / 151 / Present  0 Negative / Negative None / None  0    2/3/17 32w3d 114/68 / 110.5 kg (243 lb 9.7 oz) 34 cm / 150 / Present  0 Negative / Negative None / None      1/18/17 30w1d 102/74 / 107.9 kg (237 lb 13.7 oz) 32 cm / 145 / Present  0 Negative / Negative None / None      12/30/16 27w3d 102/60 / 107.8 kg (237 lb 10.5 oz) 27 cm / 140 / Present  0 Negative / Negative None / None      12/14/16 25w1d 116/78 / 106 kg (233 lb 11 oz) 25 cm / 134 / Present  0 Negative / Negative None / None / None      11/16/16 21w1d 114/76 / 105 kg (231 lb 9.5 oz)  /  / Present  0 Negative / Negative None / None / None      10/27/16 18w2d 106/72 / 104 kg (229 lb 4.5 oz)  / 148 / Present  0 Negative / Negative None / None / None      10/7/16 15w3d 100/70 / 102.4 kg (225 lb 12 oz)  / + / Absent  0 Negative / Negative None / None / None      9/7/16 11w1d 112/70 / 102.1 kg (225 lb 1.4 oz)  / 145 / Absent  0 Negative / Negative None / None      8/19/16 9w5d 122/80 / 103.1 kg (227 lb 6.5 oz)  / 140 / Absent  0 Negative / Negative None / None         Number of fetuses: 1       Your Vitals Were     Last Period                   06/12/2016 (Exact Date)           Allergies as of 3/3/2017     No Known Allergies      Immunizations Administered on Date of Encounter - 3/3/2017     Name Date Dose VIS Date Route    TDAP 3/3/2017 0.5 mL 2/24/2015 Intramuscular      Language Assistance Services     ATTENTION: Language assistance services are available, free of charge. Please call [phone].      ATENCIÓN: Si  habla merline, tiene a spain disposición servicios gratuitos de asistencia lingüística. Lljanelle al [phone].     CHÚ Ý: N?u b?n nói Ti?ng Vi?t, có các d?ch v? h? tr? ngôn ng? mi?n phí dành cho b?n. G?i s? [phone].         [facility] complies with applicable Federal civil rights laws and does not discriminate on the basis of race, color, national origin, age, disability, or sex.

## 2017-03-03 NOTE — PROGRESS NOTES
Ordering Physician: Dr. Baldwin    Order Type: Verbal     During visit today patient received injection of Tdap to right deltoid. Patient tolerated well, no allergic reaction noted. Requested patient to remain 10 minutes after injection.     Pre-Pain Scale:None     Post Pain Scale:None

## 2017-03-10 ENCOUNTER — PROCEDURE VISIT (OUTPATIENT)
Dept: OBSTETRICS AND GYNECOLOGY | Facility: CLINIC | Age: 27
End: 2017-03-10
Attending: OBSTETRICS & GYNECOLOGY
Payer: COMMERCIAL

## 2017-03-10 VITALS
BODY MASS INDEX: 45.06 KG/M2 | WEIGHT: 246.38 LBS | SYSTOLIC BLOOD PRESSURE: 114 MMHG | DIASTOLIC BLOOD PRESSURE: 76 MMHG

## 2017-03-10 DIAGNOSIS — Z34.90 PREGNANCY, UNSPECIFIED GESTATIONAL AGE: Primary | ICD-10-CM

## 2017-03-10 DIAGNOSIS — Z36.89 ENCOUNTER FOR ULTRASOUND TO CHECK FETAL GROWTH: ICD-10-CM

## 2017-03-10 PROCEDURE — 99999 PR PBB SHADOW E&M-EST. PATIENT-LVL II: CPT | Mod: PBBFAC,,, | Performed by: OBSTETRICS & GYNECOLOGY

## 2017-03-10 PROCEDURE — 0502F SUBSEQUENT PRENATAL CARE: CPT | Mod: S$GLB,,, | Performed by: OBSTETRICS & GYNECOLOGY

## 2017-03-10 PROCEDURE — 76816 OB US FOLLOW-UP PER FETUS: CPT | Mod: S$GLB,,, | Performed by: PEDIATRICS

## 2017-03-10 NOTE — MR AVS SNAPSHOT
[facility]  [address]  [address]  [facility] [address]  Phone: [phone]  Fax: [phone]                  Jyotsna Moss   3/10/2017 1:00 PM   Routine Prenatal    Description:  Female : 1990   Provider:  Alida Baldwin MD   Department:  [facility]           Reason for Visit     Routine Prenatal Visit           Diagnoses this Visit        Comments    Pregnancy, unspecified gestational age    -  Primary            To Do List           Future Appointments        Provider Department Dept Phone    3/17/2017 9:45 AM [facility]inette Mckeon [facility] [phone]    3/20/2017 1:30 PM [facility], WOMEN'S ULTRASOUND [facility] [phone]      Goals (5 Years of Data)     None      [facility]     [facility] -  Assistance  Registered nurses in the [facility] provide clinical advisement, health education, appointment booking, and other advisory services.  Call for this free service at [phone].             Medications           Message regarding Medications     Verify the changes and/or additions to your medication regime listed below are the same as discussed with your clinician today.  If any of these changes or additions are incorrect, please notify your healthcare provider.             Verify that the below list of medications is an accurate representation of the medications you are currently taking.  If none reported, the list may be blank. If incorrect, please contact your healthcare provider. Carry this list with you in case of emergency.           Current Medications     doxylamine-pyridoxine (DICLEGIS) 10-10 mg TbEC Take 2 tablets by mouth once daily.    famotidine (PEPCID) 20 MG tablet Take 1 tablet (20 mg total) by mouth nightly as needed for Heartburn.           Clinical Reference Information           Prenatal Vitals     Enc. Date GA Prenatal Vitals Prenatal Pulse Pain Level Urine  Albumin/Glucose Edema Presentation Dilation/Effacement/Station    3/10/17 37w3d 114/76 / 111.7 kg (246 lb 5.8 oz)    Negative /        3/3/17 36w3d 118/70 / 111.7 kg (246 lb 5.8 oz) 40 cm / 144 / Present  0 Negative / Negative None / None Tr Breech 0    2/23/17 35w2d 122/70 / 111.1 kg (244 lb 14.9 oz) 40 cm / 152 / Present  0  None / None      2/17/17 34w3d 112/68 / 111.7 kg (246 lb 2.3 oz) 37 cm / 151 / Present  0 Negative / Negative None / None  0    2/3/17 32w3d 114/68 / 110.5 kg (243 lb 9.7 oz) 34 cm / 150 / Present  0 Negative / Negative None / None      1/18/17 30w1d 102/74 / 107.9 kg (237 lb 13.7 oz) 32 cm / 145 / Present  0 Negative / Negative None / None      12/30/16 27w3d 102/60 / 107.8 kg (237 lb 10.5 oz) 27 cm / 140 / Present  0 Negative / Negative None / None      12/14/16 25w1d 116/78 / 106 kg (233 lb 11 oz) 25 cm / 134 / Present  0 Negative / Negative None / None / None      11/16/16 21w1d 114/76 / 105 kg (231 lb 9.5 oz)  /  / Present  0 Negative / Negative None / None / None      10/27/16 18w2d 106/72 / 104 kg (229 lb 4.5 oz)  / 148 / Present  0 Negative / Negative None / None / None      10/7/16 15w3d 100/70 / 102.4 kg (225 lb 12 oz)  / + / Absent  0 Negative / Negative None / None / None      9/7/16 11w1d 112/70 / 102.1 kg (225 lb 1.4 oz)  / 145 / Absent  0 Negative / Negative None / None      8/19/16 9w5d 122/80 / 103.1 kg (227 lb 6.5 oz)  / 140 / Absent  0 Negative / Negative None / None         Number of fetuses: 1       Your Vitals Were     BP Weight Last Period BMI       114/76 111.7 kg (246 lb 5.8 oz) 06/12/2016 (Exact Date) 45.06 kg/m2       Allergies as of 3/10/2017     No Known Allergies      Immunizations Administered on Date of Encounter - 3/10/2017     None      Orders Placed During Today's Visit     Future Labs/Procedures Expected by Expires    US OB/GYN Procedure (Viewpoint) - Extended List  As directed 3/10/2018      Language Assistance Services     ATTENTION: Language assistance  services are available, free of charge. Please call [phone].      ATENCIÓN: Si habla merline, tiene a spain disposición servicios gratuitos de asistencia lingüística. Llame al [phone].     CHÚ Ý: N?u b?n nói Ti?ng Vi?t, có các d?ch v? h? tr? ngôn ng? mi?n phí dành cho b?n. G?i s? [phone].         [facility] complies with applicable Federal civil rights laws and does not discriminate on the basis of race, color, national origin, age, disability, or sex.

## 2017-03-10 NOTE — PROCEDURES
Procedures     Indication  ========    Evaluation of fetal growth.    Method  ======    Transabdominal ultrasound examination.    Pregnancy  =========    Siegel pregnancy. Number of fetuses: 1.    Dating  ======    LMP on: 6/12/2016  Cycle: LMP not sure  GA by LMP 38 w + 5 d  ANNIE by LMP: 3/19/2017  Ultrasound examination on: 3/10/2017  GA by U/S based upon: AC, BPD, Femur, HC  GA by U/S 39 w + 1 d  ANNIE by U/S: 3/16/2017  Assigned: The calculation of the gestational age based on BPD, HC, AC and FL.  The Best Overall Assessment is based on the ultrasound examination on 08/10/16.  Assigned GA 37 w + 3 d  Assigned ANNIE: 3/28/2017    General Evaluation  ==============    Cardiac activity: present.  bpm.  Fetal movements: visualized.  Presentation: cephalic.  Placenta: anterior.  Umbilical cord: 3 vessel cord.  Amniotic fluid: Amount of AF: normal amount. MVP 7.7 cm.      Fetal Biometry  ============    Fetal Biometry  BPD 94.9 mm 92% 38w 5d Hadlock  .0 mm 80% 39w 5d Hadlock  .0 mm >99% 41w 0d Hadlock  Femur 72.4 mm 41% 37w 0d Hadlock  EFW 3,905 g 97% Hadlock  Calculated by: Hadlock (BPD-HC-AC-FL)  EFW (lb) 8 lb  EFW (oz) 10 oz  HC / AC 0.93  FL / BPD 0.76  FL / AC 0.19  MVP 7.7 cm   bpm    Fetal Anatomy  ============    Cranium: normal  Lateral ventricles: documented previously  Choroid plexus: documented previously  Midline falx: documented previously  Cavum septi pellucidi: documented previously  Cerebellum: documented previously  Lips: normal  Profile: documented previously  Nose: normal  4-chamber view: normal  RVOT: normal  LVOT: normal  Situs: normal  Aortic arch: normal  SVC: normal  IVC: normal  3-vessel view: normal  Diaphragm: normal  Stomach: normal  Kidneys: normal  Bladder: normal  Arms: both visualized  Legs: both visualized    Impression  =========    Limited anatomy was negative. No anomalies seen. AFV normal.    The biometry obtained on today's scan is consistent with a  macrosomic growth profile.    Recommendation  ==============    Thanks once again for allowing us to participate in the care of your patients. If you have any questions concerning today's consultation feel free  to contact me or one of my partners. We can be reached at [phone] during normal business hours. If you have a question after normal  business hours, please contact Labor and Delivery [phone] and the unit secretary will page our on call physician.

## 2017-03-10 NOTE — MR AVS SNAPSHOT
[facility]  [address]  [address]  [facility] [address]  Phone: [phone]  Fax: [phone]                  Jyotsna Moss   3/10/2017 11:30 AM   Procedure visit    Description:  Female : 1990   Provider:  [facility], WOMEN'S ULTRASOUND   [facility]  [facility]           Diagnoses this Visit        Comments    Encounter for ultrasound to check fetal growth                To Do List           Future Appointments        Provider Department Dept Phone    3/17/2017 9:45 AM Antoinette Mckeon MD [facility] [phone]    3/20/2017 1:30 PM [facility] [phone]      Goals (5 Years of Data)     None      [facility]     [facility] On Call Nurse Care Line -  Assistance  Registered nurses in the [facility] On Call Center provide clinical advisement, health education, appointment booking, and other advisory services.  Call for this free service at [phone].             Medications           Message regarding Medications     Verify the changes and/or additions to your medication regime listed below are the same as discussed with your clinician today.  If any of these changes or additions are incorrect, please notify your healthcare provider.             Verify that the below list of medications is an accurate representation of the medications you are currently taking.  If none reported, the list may be blank. If incorrect, please contact your healthcare provider. Carry this list with you in case of emergency.           Current Medications     doxylamine-pyridoxine (DICLEGIS) 10-10 mg TbEC Take 2 tablets by mouth once daily.    famotidine (PEPCID) 20 MG tablet Take 1 tablet (20 mg total) by mouth nightly as needed for Heartburn.           Clinical Reference Information           Prenatal Vitals     Enc. Date GA Prenatal Vitals Prenatal Pulse Pain Level Urine Albumin/Glucose Edema Presentation  Dilation/Effacement/Station    3/10/17 37w3d 114/76 / 111.7 kg (246 lb 5.8 oz)    Negative / Negative       3/3/17 36w3d 118/70 / 111.7 kg (246 lb 5.8 oz) 40 cm / 144 / Present  0 Negative / Negative None / None Tr Breech 0    2/23/17 35w2d 122/70 / 111.1 kg (244 lb 14.9 oz) 40 cm / 152 / Present  0  None / None      2/17/17 34w3d 112/68 / 111.7 kg (246 lb 2.3 oz) 37 cm / 151 / Present  0 Negative / Negative None / None  0    2/3/17 32w3d 114/68 / 110.5 kg (243 lb 9.7 oz) 34 cm / 150 / Present  0 Negative / Negative None / None      1/18/17 30w1d 102/74 / 107.9 kg (237 lb 13.7 oz) 32 cm / 145 / Present  0 Negative / Negative None / None      12/30/16 27w3d 102/60 / 107.8 kg (237 lb 10.5 oz) 27 cm / 140 / Present  0 Negative / Negative None / None      12/14/16 25w1d 116/78 / 106 kg (233 lb 11 oz) 25 cm / 134 / Present  0 Negative / Negative None / None / None      11/16/16 21w1d 114/76 / 105 kg (231 lb 9.5 oz)  /  / Present  0 Negative / Negative None / None / None      10/27/16 18w2d 106/72 / 104 kg (229 lb 4.5 oz)  / 148 / Present  0 Negative / Negative None / None / None      10/7/16 15w3d 100/70 / 102.4 kg (225 lb 12 oz)  / + / Absent  0 Negative / Negative None / None / None      9/7/16 11w1d 112/70 / 102.1 kg (225 lb 1.4 oz)  / 145 / Absent  0 Negative / Negative None / None      8/19/16 9w5d 122/80 / 103.1 kg (227 lb 6.5 oz)  / 140 / Absent  0 Negative / Negative None / None         Number of fetuses: 1       Your Vitals Were     Last Period                   06/12/2016 (Exact Date)           Allergies as of 3/10/2017     No Known Allergies      Immunizations Administered on Date of Encounter - 3/10/2017     None      Orders Placed During Today's Visit      Normal Orders This Visit    US OB/GYN Procedure (Viewpoint) - Extended List - Future       Language Assistance Services     ATTENTION: Language assistance services are available, free of charge. Please call [phone].      ATENCIÓN: [place] escobar  español, tiene a spain disposición servicios gratuitos de asistencia lingüística. Marlene al [phone].     DENEEN Ý: N?u b?n nói Ti?ng Vi?t, có các d?ch v? h? tr? ngôn ng? mi?n phí dành cho b?n. G?i s? [phone].         [facility] complies with applicable Federal civil rights laws and does not discriminate on the basis of race, color, national origin, age, disability, or sex.

## 2017-03-13 NOTE — PROGRESS NOTES
Cephalic today with 3900EFW this discussed in depth.  Patient had a normal one hour test and does not have DM.  Patient desires INÉS had a 7#10 baby last time.  Will re-ultrasound and likely schedule induction for 39 weeks.  Risk of shoulder dystocia discussed.  Tdap today

## 2017-03-17 ENCOUNTER — ROUTINE PRENATAL (OUTPATIENT)
Dept: OBSTETRICS AND GYNECOLOGY | Facility: CLINIC | Age: 27
End: 2017-03-17
Payer: COMMERCIAL

## 2017-03-17 VITALS — BODY MASS INDEX: 44.4 KG/M2 | SYSTOLIC BLOOD PRESSURE: 124 MMHG | DIASTOLIC BLOOD PRESSURE: 72 MMHG | WEIGHT: 242.75 LBS

## 2017-03-17 DIAGNOSIS — Z3A.38 38 WEEKS GESTATION OF PREGNANCY: ICD-10-CM

## 2017-03-17 DIAGNOSIS — O36.63X0: Primary | ICD-10-CM

## 2017-03-17 DIAGNOSIS — O99.213 OBESITY IN PREGNANCY, ANTEPARTUM, THIRD TRIMESTER: ICD-10-CM

## 2017-03-17 PROCEDURE — 0502F SUBSEQUENT PRENATAL CARE: CPT | Mod: S$GLB,,, | Performed by: OBSTETRICS & GYNECOLOGY

## 2017-03-17 PROCEDURE — 99999 PR PBB SHADOW E&M-EST. PATIENT-LVL II: CPT | Mod: PBBFAC,,, | Performed by: OBSTETRICS & GYNECOLOGY

## 2017-03-20 ENCOUNTER — PROCEDURE VISIT (OUTPATIENT)
Dept: OBSTETRICS AND GYNECOLOGY | Facility: CLINIC | Age: 27
End: 2017-03-20
Attending: OBSTETRICS & GYNECOLOGY
Payer: COMMERCIAL

## 2017-03-20 ENCOUNTER — TELEPHONE (OUTPATIENT)
Dept: OBSTETRICS AND GYNECOLOGY | Facility: CLINIC | Age: 27
End: 2017-03-20

## 2017-03-20 ENCOUNTER — ROUTINE PRENATAL (OUTPATIENT)
Dept: OBSTETRICS AND GYNECOLOGY | Facility: CLINIC | Age: 27
End: 2017-03-20
Payer: COMMERCIAL

## 2017-03-20 DIAGNOSIS — Z3A.38 38 WEEKS GESTATION OF PREGNANCY: ICD-10-CM

## 2017-03-20 DIAGNOSIS — Z34.90 GRAVIDA 2, CURRENTLY PREGNANT: Primary | ICD-10-CM

## 2017-03-20 DIAGNOSIS — Z34.90 PREGNANCY, UNSPECIFIED GESTATIONAL AGE: ICD-10-CM

## 2017-03-20 PROCEDURE — 0502F SUBSEQUENT PRENATAL CARE: CPT | Mod: S$GLB,,, | Performed by: NURSE PRACTITIONER

## 2017-03-20 NOTE — MR AVS SNAPSHOT
[facility]  [address]  [address]  [facility] [address]  Phone: [phone]  Fax: [phone]                  Jyotsna Moss   3/20/2017 2:00 PM   Routine Prenatal    Description:  Female : 1990   Provider:  Juanita Bob NP   Department:  [facility]           Diagnoses this Visit        Comments     2, currently pregnant    -  Primary     38 weeks gestation of pregnancy                To Do List           Goals (5 Years of Data)     None      Follow-Up and Disposition     Follow-up and Disposition History      [facility]     [facility] Nurse Care [facility] -  Assistance  Registered nurses in the [facility] provide clinical advisement, health education, appointment booking, and other advisory services.  Call for this free service at [phone].             Medications           Message regarding Medications     Verify the changes and/or additions to your medication regime listed below are the same as discussed with your clinician today.  If any of these changes or additions are incorrect, please notify your healthcare provider.             Verify that the below list of medications is an accurate representation of the medications you are currently taking.  If none reported, the list may be blank. If incorrect, please contact your healthcare provider. Carry this list with you in case of emergency.           Current Medications     doxylamine-pyridoxine (DICLEGIS) 10-10 mg TbEC Take 2 tablets by mouth once daily.    famotidine (PEPCID) 20 MG tablet Take 1 tablet (20 mg total) by mouth nightly as needed for Heartburn.           Clinical Reference Information           Prenatal Vitals     Enc. Date GA Prenatal Vitals Prenatal Pulse Pain Level Urine Albumin/Glucose Edema Presentation Dilation/Effacement/Station    3/20/17 38w6d       Vertex 3 / 20    3/17/17 38w3d 124/72 / 110.1 kg (242 lb 11.6 oz) 43 cm / 140 / Present  0 Negative /  Negative +1 / +1 Vertex 3 / 20 / -3    3/10/17 37w3d 114/76 / 111.7 kg (246 lb 5.8 oz) 40 cm / 140 / Present  0 Negative / Negative None / None /  / No Vertex 3 / 20 / -4    3/3/17 36w3d 118/70 / 111.7 kg (246 lb 5.8 oz) 40 cm / 144 / Present  0 Negative / Negative None / None Tr Breech 0    2/23/17 35w2d 122/70 / 111.1 kg (244 lb 14.9 oz) 40 cm / 152 / Present  0  None / None      2/17/17 34w3d 112/68 / 111.7 kg (246 lb 2.3 oz) 37 cm / 151 / Present  0 Negative / Negative None / None  0    2/3/17 32w3d 114/68 / 110.5 kg (243 lb 9.7 oz) 34 cm / 150 / Present  0 Negative / Negative None / None      1/18/17 30w1d 102/74 / 107.9 kg (237 lb 13.7 oz) 32 cm / 145 / Present  0 Negative / Negative None / None      12/30/16 27w3d 102/60 / 107.8 kg (237 lb 10.5 oz) 27 cm / 140 / Present  0 Negative / Negative None / None      12/14/16 25w1d 116/78 / 106 kg (233 lb 11 oz) 25 cm / 134 / Present  0 Negative / Negative None / None / None      11/16/16 21w1d 114/76 / 105 kg (231 lb 9.5 oz)  /  / Present  0 Negative / Negative None / None / None      10/27/16 18w2d 106/72 / 104 kg (229 lb 4.5 oz)  / 148 / Present  0 Negative / Negative None / None / None      10/7/16 15w3d 100/70 / 102.4 kg (225 lb 12 oz)  / + / Absent  0 Negative / Negative None / None / None      9/7/16 11w1d 112/70 / 102.1 kg (225 lb 1.4 oz)  / 145 / Absent  0 Negative / Negative None / None      8/19/16 9w5d 122/80 / 103.1 kg (227 lb 6.5 oz)  / 140 / Absent  0 Negative / Negative None / None         Number of fetuses: 1       Your Vitals Were     Last Period                   06/12/2016 (Exact Date)           Allergies as of 3/20/2017     No Known Allergies      Immunizations Administered on Date of Encounter - 3/20/2017     None      Instructions    Topic  General Pregnancy Information Recommended   (Unless Otherwise Contraindicated Or Restrictions Given To You By Your OB Doctor)      1. Anticipated course of prenatal care       Visits: will be Every 4 wks  until 28 weeks, then every 2 weeks until 36 weeks, and then weekly until delivery.    Urine will be collected at each Obstetric visit        2. Nutrition and weight gain     Daily pre-peg vitamin (recommend taking at night)    Additional 300 calories needed daily   No Sushi, hotdogs, unpasteurized products (milk/cheeses). No large fish such as: shark, kaleb mackerel, tile, sword fish    Incorporate 12 ounces of smaller seafood/week and no more than 6oz of albacore tuna    Caffeine: 200 mg/day or 2 cups of caffeine/day    Weight gain recommendations are based off of BMI before pregnancy. Generally patients who with normal weight prior pregnancy it is recommended 25-35 pounds of weight gain during the pregnancy with an estimated weekly gain of 1 pound/wk in 2nd and 3rd Trimester.    3. Toxoplasmosis precautions   If cats are in the home avoid changing litter boxes and if you need to change the litter box recommended you use gloves   4. Sexual Activity   Sexual activity is okay unless you are put on restrictions by your provider. I recommend urinating after intercourse    5. Exercise   Generally pre-pregnancy routine is okay to continue    Drink plenty fluids for hydration    Stop any activity that causes heavy cramping like a period or bright red bleeding and contact your provider   No extreme or contact sports    No exercise on your back for an extended period of time after 20 weeks    6. Hot Tub/Saunas   Avoid hot tubes and saunas    7. Hair Treatments   Because of the lack of scientific studies on the effects of chemical treatments on your hair, we must advise that you do it at your own risk. If you choose to treat your hair, we recommend that you wait until after 12 weeks gestation. At this time there is no reason to believe that normal hair treatment is associated with onsequences to the baby.    8. Vaccines   Influenza vaccine is recommended by CDC during flu season    Tdap (pertussis or  whopping cough) recommended each pregnancy between 27 and 36 weeks    Tdap booster recommended for family and other planned direct caregivers    9. Water   Water is an important nutrient in a good diet. However, it cannot be stressed enough that during pregnancy water is essential. The body has increased circulation through blood vessels, and without a large increase in water, pregnant women will be dehydrated. It plays an important role in decreasing constipation, preventing  contractions, decreasing swelling, and preventing dizziness. We recommend that you drink 8-10 glasses of water per day.    10. Smoking/Alcohol/Illicit Drug Use   No safe Level    Can lead to problems with pregnancy    Growth of the developing fetus     labor (delivery before 37 weeks)     rupture of the membranes (water breaking before 37 weeks)    Premature separation of the placenta (which may cause bleeding)    American College of Obstetricians and Gynecologists endorses abstinence    Can lead to babies with disabilities    11. Environmental or work hazards   Unless otherwise restricted you may continue work throughout the pregnancy    Notify your provider of any work hazards or chemical exposure concerns   12. Travel     Safe to travel up to 35 weeks    Continue to wear a seatbelt and airbags are still recommended    Drink plenty fluids    Blood clots are a concern during pregnancy with long travel. Recommend compression stockings and moving around at least every 2 hours and staying hydrated.    13. Use of medications, vitamins, herbs, OTC drugs     Any medications not on the list provided to you from our clinic or given to you by one of our providers we recommend calling to make sure the medication is safe for you and baby.    14. Domestic Violence     Please notify office immediately of any concerns or violence so that we can help direct you to assistance needed    [facility] Against  Domestic Violence: [phone]    15. Childbirth classes     List of Childbirth classes from [facility] is available    16. Selecting a Pediatrician   Selecting a pediatrician before delivery is recommended   You can interview pediatricians before delivery    17. Fetal Monitoring     A simple test of your babys well-being is a kick count. After 26 weeks, fetal motion of any kind should be monitored. Further discussion at that time   18.  Labor Signs     Water break, leaking fluids from Vagina prior 37 weeks   Regular contractions, Contractions that are more than 5-6/hour, getting stronger and painful with lower back pain, does not go away with rest and fluids    19. Postpartum Family Planning     Multiple options available from short term methods to long term reversible and irreversible methods    Discuss with provider as you get closer to delivery    20. Dental     It is recommended that you get an annual dental cleaning    21. Breastfeeding     Classes offered at [facility] and it is recommended to take a class    22. Lifting  In 2013, the [employer] [place] for Occupational Safety and Health (NIOSH) published clinical guidelines for occupational lifting in uncomplicated pregnancies. The recommended weight limits are based on gestational age, intermittent versus repetitive lifting, time (hours/day) spent lifting, and lifting height from floor and distance in 3 front of body. In this guideline, the maximum permissible weight for a woman less than 20 weeks of gestation performing infrequent lifting is 36 pounds (16 kgs) and the maximum permissible weight at ?20 weeks is 26 pounds (12 kgs). For repetitive lifting ?1 hour/day, the maximum weights in the first and second half of pregnancy are 18 pounds (8 kgs) and 13 pounds (6 kgs), respectively, and for repetitive lifting <1 hour/day, the maximum weights are 30 pounds (14 kgs) and 22 pounds (10 kgs), respectively. Although not based on high quality  evidence, these guidelines are a reasonable reference for counseling pregnant women     23. Scheduling and Provider Availability     Your Obstetric Doctor is usually here weekly but not every day. We recommend you make 3-4 advanced appointments at a time to accommodate your personal needs and work/school obligations.    We ask that you come 15 minutes prior your scheduled appointment.    For same day appointments (not routine appointments) there is a Nurse Practitioner or another obstetric provider available. Please let the  aware you are an OB patient requesting a same day appointment.      24. Recommended Phone Zaina     [facility]               Language Assistance Services     ATTENTION: Language assistance services are available, free of charge. Please call [phone].      ATENCIÓN: Si habla español, tiene a spain disposición servicios gratuitos de asistencia lingüística. Llame al [phone].     DENEEN Ý: N?u b?n nói Ti?ng Vi?t, có các d?ch v? h? tr? ngôn ng? mi?n phí dành cho b?n. G?i s? [phone].         [facility] complies with applicable Federal civil rights laws and does not discriminate on the basis of race, color, national origin, age, disability, or sex.

## 2017-03-20 NOTE — TELEPHONE ENCOUNTER
38 6/7 week OB  She is asking if she can have her induction tomorrow morning instead of tomorrow night.  Her father in law is having surgery on his arm on Wednesday and won't be able to come see the baby or hold the baby.

## 2017-03-20 NOTE — MR AVS SNAPSHOT
[facility]  [address]  [address]  [facility] [address]  Phone: [phone]  Fax: [phone]                  Jyotsna Moss   3/20/2017 1:30 PM   Procedure visit    Description:  Female : 1990   Provider:  [facility]   Department:  [facility]           Diagnoses this Visit        Comments    Pregnancy, unspecified gestational age                To Do List           Goals (5 Years of Data)     None      [facility]     [facility] Care Line -  Assistance  Registered nurses in the [facility] Center provide clinical advisement, health education, appointment booking, and other advisory services.  Call for this free service at [phone].             Medications           Message regarding Medications     Verify the changes and/or additions to your medication regime listed below are the same as discussed with your clinician today.  If any of these changes or additions are incorrect, please notify your healthcare provider.             Verify that the below list of medications is an accurate representation of the medications you are currently taking.  If none reported, the list may be blank. If incorrect, please contact your healthcare provider. Carry this list with you in case of emergency.           Current Medications     doxylamine-pyridoxine (DICLEGIS) 10-10 mg TbEC Take 2 tablets by mouth once daily.    famotidine (PEPCID) 20 MG tablet Take 1 tablet (20 mg total) by mouth nightly as needed for Heartburn.           Clinical Reference Information           Prenatal Vitals     Enc. Date GA Prenatal Vitals Prenatal Pulse Pain Level Urine Albumin/Glucose Edema Presentation Dilation/Effacement/Station    3/17/17 38w3d 124/72 / 110.1 kg (242 lb 11.6 oz) 43 cm / 140 / Present  0 Negative / Negative +1 / +1 Vertex 3 / 20 / -3    3/10/17 37w3d 114/76 / 111.7 kg (246 lb 5.8 oz) 40 cm / 140 / Present  0 Negative / Negative None / None /   / No Vertex 3 / 20 / -4    3/3/17 36w3d 118/70 / 111.7 kg (246 lb 5.8 oz) 40 cm / 144 / Present  0 Negative / Negative None / None Tr Breech 0    2/23/17 35w2d 122/70 / 111.1 kg (244 lb 14.9 oz) 40 cm / 152 / Present  0  None / None      2/17/17 34w3d 112/68 / 111.7 kg (246 lb 2.3 oz) 37 cm / 151 / Present  0 Negative / Negative None / None  0    2/3/17 32w3d 114/68 / 110.5 kg (243 lb 9.7 oz) 34 cm / 150 / Present  0 Negative / Negative None / None      1/18/17 30w1d 102/74 / 107.9 kg (237 lb 13.7 oz) 32 cm / 145 / Present  0 Negative / Negative None / None      12/30/16 27w3d 102/60 / 107.8 kg (237 lb 10.5 oz) 27 cm / 140 / Present  0 Negative / Negative None / None      12/14/16 25w1d 116/78 / 106 kg (233 lb 11 oz) 25 cm / 134 / Present  0 Negative / Negative None / None / None      11/16/16 21w1d 114/76 / 105 kg (231 lb 9.5 oz)  /  / Present  0 Negative / Negative None / None / None      10/27/16 18w2d 106/72 / 104 kg (229 lb 4.5 oz)  / 148 / Present  0 Negative / Negative None / None / None      10/7/16 15w3d 100/70 / 102.4 kg (225 lb 12 oz)  / + / Absent  0 Negative / Negative None / None / None      9/7/16 11w1d 112/70 / 102.1 kg (225 lb 1.4 oz)  / 145 / Absent  0 Negative / Negative None / None      8/19/16 9w5d 122/80 / 103.1 kg (227 lb 6.5 oz)  / 140 / Absent  0 Negative / Negative None / None         Number of fetuses: 1       Your Vitals Were     Last Period                   06/12/2016 (Exact Date)           Allergies as of 3/20/2017     No Known Allergies      Immunizations Administered on Date of Encounter - 3/20/2017     None      Orders Placed During Today's Visit      Normal Orders This Visit    US OB/GYN Procedure ([facility]) - Extended List       Language Assistance Services     ATTENTION: Language assistance services are available, free of charge. Please call [phone].      ATENCIÓN: Si habla merline, tiene a spain disposición servicios gratuitos de asistencia lingüística. Llame al  [phone].     DENEEN Ý: N?u b?n nói Ti?ng Vi?t, có các d?ch v? h? tr? ngôn ng? mi?n phí dành cho b?n. G?i s? [phone].         [facility] complies with applicable Federal civil rights laws and does not discriminate on the basis of race, color, national origin, age, disability, or sex.

## 2017-03-20 NOTE — TELEPHONE ENCOUNTER
Nicolasa pt, has questions about scheduling her induction. Pt wants to r/s her induction but is not sure if it has been scheduled yet.

## 2017-03-20 NOTE — PROGRESS NOTES
Chief Complaint:  Obstetric Problem Visit    HPI: Jyotsna Moss is a 26 y.o., , at 38w6d wks here for growth US FU. Baby is with EFW 9.4 lb, (4189). She denies any vaginal bleeding, leaking fluids, abnormal vaginal discharge,  or GI complaints. Fetal movement detected at this time. She did report passing part of a mucus plug this morning    Physical Exam:   See US report  Cervix: ~3.0 and 20%   Presentation: Cephalic    Plan:   1. Notify Dr. Baldwin and pt will be induced tomorrow night. Discussed case with pt and questions answered and verbalized understanding

## 2017-03-20 NOTE — PATIENT INSTRUCTIONS
Topic  General Pregnancy Information Recommended   (Unless Otherwise Contraindicated Or Restrictions Given To You By Your OB Doctor)      1. Anticipated course of prenatal care       Visits: will be Every 4 wks until 28 weeks, then every 2 weeks until 36 weeks, and then weekly until delivery.    Urine will be collected at each Obstetric visit        2. Nutrition and weight gain     Daily pre-peg vitamin (recommend taking at night)    Additional 300 calories needed daily   No Sushi, hotdogs, unpasteurized products (milk/cheeses). No large fish such as: shark, kaleb mackerel, tile, sword fish    Incorporate 12 ounces of smaller seafood/week and no more than 6oz of albacore tuna    Caffeine: 200 mg/day or 2 cups of caffeine/day    Weight gain recommendations are based off of BMI before pregnancy. Generally patients who with normal weight prior pregnancy it is recommended 25-35 pounds of weight gain during the pregnancy with an estimated weekly gain of 1 pound/wk in 2nd and 3rd Trimester.    3. Toxoplasmosis precautions   If cats are in the home avoid changing litter boxes and if you need to change the litter box recommended you use gloves   4. Sexual Activity   Sexual activity is okay unless you are put on restrictions by your provider. I recommend urinating after intercourse    5. Exercise   Generally pre-pregnancy routine is okay to continue    Drink plenty fluids for hydration    Stop any activity that causes heavy cramping like a period or bright red bleeding and contact your provider   No extreme or contact sports    No exercise on your back for an extended period of time after 20 weeks    6. Hot Tub/Saunas   Avoid hot tubes and saunas    7. Hair Treatments   Because of the lack of scientific studies on the effects of chemical treatments on your hair, we must advise that you do it at your own risk. If you choose to treat your hair, we recommend that you wait until after 12 weeks gestation. At  this time there is no reason to believe that normal hair treatment is associated with onsequences to the baby.    8. Vaccines   Influenza vaccine is recommended by CDC during flu season    Tdap (pertussis or whopping cough) recommended each pregnancy between 27 and 36 weeks    Tdap booster recommended for family and other planned direct caregivers    9. Water   Water is an important nutrient in a good diet. However, it cannot be stressed enough that during pregnancy water is essential. The body has increased circulation through blood vessels, and without a large increase in water, pregnant women will be dehydrated. It plays an important role in decreasing constipation, preventing  contractions, decreasing swelling, and preventing dizziness. We recommend that you drink 8-10 glasses of water per day.    10. Smoking/Alcohol/Illicit Drug Use   No safe Level    Can lead to problems with pregnancy    Growth of the developing fetus     labor (delivery before 37 weeks)     rupture of the membranes (water breaking before 37 weeks)    Premature separation of the placenta (which may cause bleeding)    American College of Obstetricians and Gynecologists endorses abstinence    Can lead to babies with disabilities    11. Environmental or work hazards   Unless otherwise restricted you may continue work throughout the pregnancy    Notify your provider of any work hazards or chemical exposure concerns   12. Travel     Safe to travel up to 35 weeks    Continue to wear a seatbelt and airbags are still recommended    Drink plenty fluids    Blood clots are a concern during pregnancy with long travel. Recommend compression stockings and moving around at least every 2 hours and staying hydrated.    13. Use of medications, vitamins, herbs, OTC drugs     Any medications not on the list provided to you from our clinic or given to you by one of our providers we recommend calling to make sure the  medication is safe for you and baby.    14. Domestic Violence     Please notify office immediately of any concerns or violence so that we can help direct you to assistance needed    [place] [employer] Against Domestic Violence: [phone]    15. Childbirth classes     List of Childbirth classes from [facility] is available    16. Selecting a Pediatrician   Selecting a pediatrician before delivery is recommended   You can interview pediatricians before delivery    17. Fetal Monitoring     A simple test of your babys well-being is a kick count. After 26 weeks, fetal motion of any kind should be monitored. Further discussion at that time   18.  Labor Signs     Water break, leaking fluids from Vagina prior 37 weeks   Regular contractions, Contractions that are more than 5-6/hour, getting stronger and painful with lower back pain, does not go away with rest and fluids    19. Postpartum Family Planning     Multiple options available from short term methods to long term reversible and irreversible methods    Discuss with provider as you get closer to delivery    20. Dental     It is recommended that you get an annual dental cleaning    21. Breastfeeding     Classes offered at [facility] and it is recommended to take a class    22. Lifting  In 2013, the National [place] for Occupational Safety and Health (NIOSH) published clinical guidelines for occupational lifting in uncomplicated pregnancies. The recommended weight limits are based on gestational age, intermittent versus repetitive lifting, time (hours/day) spent lifting, and lifting height from floor and distance in 3 front of body. In this guideline, the maximum permissible weight for a woman less than 20 weeks of gestation performing infrequent lifting is 36 pounds (16 kgs) and the maximum permissible weight at ?20 weeks is 26 pounds (12 kgs). For repetitive lifting ?1 hour/day, the maximum weights in the first and second half of pregnancy are  18 pounds (8 kgs) and 13 pounds (6 kgs), respectively, and for repetitive lifting <1 hour/day, the maximum weights are 30 pounds (14 kgs) and 22 pounds (10 kgs), respectively. Although not based on high quality evidence, these guidelines are a reasonable reference for counseling pregnant women     23. Scheduling and Provider Availability     Your Obstetric Doctor is usually here weekly but not every day. We recommend you make 3-4 advanced appointments at a time to accommodate your personal needs and work/school obligations.    We ask that you come 15 minutes prior your scheduled appointment.    For same day appointments (not routine appointments) there is a Nurse Practitioner or another obstetric provider available. Please let the  aware you are an OB patient requesting a same day appointment.      24. Recommended Phone Zaina     [facility]    [facility]

## 2017-03-21 ENCOUNTER — ANESTHESIA EVENT (OUTPATIENT)
Dept: OBSTETRICS AND GYNECOLOGY | Facility: OTHER | Age: 27
End: 2017-03-21
Payer: COMMERCIAL

## 2017-03-21 ENCOUNTER — ANESTHESIA (OUTPATIENT)
Dept: OBSTETRICS AND GYNECOLOGY | Facility: OTHER | Age: 27
End: 2017-03-21
Payer: COMMERCIAL

## 2017-03-21 PROBLEM — Z34.90 PREGNANCY: Status: ACTIVE | Noted: 2017-03-21

## 2017-03-21 PROCEDURE — 27800517 HC TRAY,EPIDURAL-CONTINUOUS: Performed by: ANESTHESIOLOGY

## 2017-03-21 PROCEDURE — 27200710 HC EPIDURAL INFUSION PUMP SET: Performed by: ANESTHESIOLOGY

## 2017-03-21 PROCEDURE — 59400 OBSTETRICAL CARE: CPT | Mod: QK,,, | Performed by: ANESTHESIOLOGY

## 2017-03-22 PROCEDURE — 62326 NJX INTERLAMINAR LMBR/SAC: CPT | Performed by: ANESTHESIOLOGY

## 2017-03-22 PROCEDURE — 63600175 PHARM REV CODE 636 W HCPCS

## 2017-03-22 PROCEDURE — 25000003 PHARM REV CODE 250: Performed by: ANESTHESIOLOGY

## 2017-03-22 RX ORDER — LIDOCAINE HYDROCHLORIDE AND EPINEPHRINE 15; 5 MG/ML; UG/ML
INJECTION, SOLUTION EPIDURAL
Status: DISCONTINUED | OUTPATIENT
Start: 2017-03-22 | End: 2017-03-22

## 2017-03-22 RX ORDER — FENTANYL/BUPIVACAINE/NS/PF 2MCG/ML-.1
PLASTIC BAG, INJECTION (ML) INJECTION CONTINUOUS PRN
Status: DISCONTINUED | OUTPATIENT
Start: 2017-03-22 | End: 2017-03-22

## 2017-03-22 RX ADMIN — Medication 5 ML: at 12:03

## 2017-03-22 RX ADMIN — FENTANYL CITRATE 100 MCG: 50 INJECTION, SOLUTION INTRAMUSCULAR; INTRAVENOUS at 12:03

## 2017-03-22 RX ADMIN — LIDOCAINE HYDROCHLORIDE AND EPINEPHRINE 3 ML: 15; 5 INJECTION, SOLUTION EPIDURAL at 12:03

## 2017-03-22 RX ADMIN — Medication 10 ML/HR: at 12:03

## 2017-03-22 NOTE — ANESTHESIA PROCEDURE NOTES
Epidural    Patient location during procedure: OB   Reason for block: primary anesthetic   Diagnosis: iup   Start time: 3/22/2017 12:08 AM  Timeout: 3/22/2017 12:03 AM  End time: 3/22/2017 12:23 AM  Staffing  Anesthesiologist: TRISTIAN OROURKE  Resident/CRNA: EUSEBIO GONZALEZ JR.  Performed by: resident/CRNA   Preanesthetic Checklist  Completed: patient identified, site marked, surgical consent, pre-op evaluation, timeout performed, IV checked, risks and benefits discussed, monitors and equipment checked, anesthesia consent given, hand hygiene performed and patient being monitored  Preparation  Patient position: sitting  Prep: ChloraPrep  Patient monitoring: Pulse Ox and Blood Pressure  Epidural  Skin Anesthetic: lidocaine 1%  Skin Wheal: 3 mL  Administration type: continuous  Approach: midline  Interspace: L3-4  Injection technique: MALINDA air  Needle and Epidural Catheter  Needle type: Tuohy   Needle gauge: 17  Needle length: 3.5 inches  Needle insertion depth: 8 cm  Catheter type: springwound  Catheter size: 19 G  Catheter at skin depth: 12 cm  Test dose: 3 mL of lidocaine 1.5% with Epi 1-to-200,000  Additional Documentation: incremental injection, negative aspiration for heme and CSF, no paresthesia on injection, no significant pain on injection, no signs/symptoms of IV or SA injection and no significant complaints from patient  Needle localization: anatomical landmarks  Medications:  Bolus administered: 10 mL of 0.125% bupivacaine  Opioid administered: 100 mcg of   fentanyl  Volume per aspiration: 5 mL  Time between aspirations: 3 minutes  Assessment  Patient's tolerance of the procedure: comfortable throughout block and no complaints

## 2017-03-22 NOTE — ANESTHESIA PREPROCEDURE EVALUATION
2017  Jyotsna Moss is a 26 y.o. female     OB History    Para Term  AB SAB TAB Ectopic Multiple Living   2 1 0       1      # Outcome Date GA Lbr Rehan/2nd Weight Sex Delivery Anes PTL Lv   2 Current            1 Para 13    F Vag-Spont   Y          Wt Readings from Last 1 Encounters:   17 1000 110.1 kg (242 lb 11.6 oz)       BP Readings from Last 3 Encounters:   17 124/72   03/10/17 114/76   17 118/70       Patient Active Problem List   Diagnosis    Absent menses    Obesity in pregnancy, antepartum    Pregnancy test positive    Excessive fetal growth, large for dates, antepartum    Pregnancy       No past surgical history on file.    Social History     Social History    Marital status:      Spouse name: N/A    Number of children: N/A    Years of education: N/A     Occupational History    Not on file.     Social History Main Topics    Smoking status: Never Smoker    Smokeless tobacco: Not on file    Alcohol use Yes    Drug use: No    Sexual activity: Yes     Partners: Male     Birth control/ protection: None     Other Topics Concern    Not on file     Social History Narrative         Chemistry    No results found for: NA, K, CL, CO2, BUN, CREATININE, GLU No results found for: CALCIUM, ALKPHOS, AST, ALT, BILITOT         Lab Results   Component Value Date    WBC 8.36 2017    HGB 11.4 (L) 2017    HCT 34.4 (L) 2017    MCV 91 2017     2017       No results for input(s): INR, PROTIME, APTT in the last 72 hours.    Invalid input(s): PT                  [facility] Anesthesia Evaluation    I have reviewed the Patient Summary Reports.    I have reviewed the Nursing Notes.   I have reviewed the Medications.     Review of Systems  Anesthesia Hx:  No problems with previous Anesthesia  History of prior surgery of interest to  airway management or planning: Denies Family Hx of Anesthesia complications.   Denies Personal Hx of Anesthesia complications.   Hematology/Oncology:  Hematology Normal   Oncology Normal     EENT/Dental:EENT/Dental Normal   Cardiovascular:  Cardiovascular Normal     Pulmonary:  Pulmonary Normal    Renal/:  Renal/ Normal     Hepatic/GI:  Hepatic/GI Normal    Musculoskeletal:  Musculoskeletal Normal    Neurological:  Neurology Normal    Endocrine:  Endocrine Normal    Dermatological:  Skin Normal    Psych:  Psychiatric Normal           Physical Exam  General:  Well nourished    Airway/Jaw/Neck:  Airway Findings: Mouth Opening: Normal Tongue: Normal  General Airway Assessment: Adult  Mallampati: II  Improves to II with phonation.  TM Distance: Normal, at least 6 cm     Eyes/Ears/Nose:  EYES/EARS/NOSE FINDINGS: Normal         Mental Status:  Mental Status Findings: Normal        Anesthesia Plan  Type of Anesthesia, risks & benefits discussed:  Anesthesia Type:  epidural, CSE, general, spinal  Patient's Preference:   Intra-op Monitoring Plan: standard ASA monitors  Intra-op Monitoring Plan Comments:   Post Op Pain Control Plan:   Post Op Pain Control Plan Comments:   Induction:    Beta Blocker:  Patient is not currently on a Beta-Blocker (No further documentation required).       Informed Consent: Patient understands risks and agrees with Anesthesia plan.  Questions answered. Anesthesia consent signed with patient.  ASA Score: 2     Day of Surgery Review of History & Physical:            Ready For Surgery From Anesthesia Perspective.

## 2017-03-23 NOTE — ANESTHESIA POSTPROCEDURE EVALUATION
"Anesthesia Post Evaluation    Patient: Jyotsna Moss    Procedure(s) Performed: * No procedures listed *    Final Anesthesia Type: epidural  Patient location during evaluation: labor & delivery  Patient participation: Yes- Able to Participate  Level of consciousness: awake and alert  Post-procedure vital signs: reviewed and stable  Pain management: adequate  Airway patency: patent  PONV status at discharge: No PONV  Anesthetic complications: no      Cardiovascular status: blood pressure returned to baseline  Hydration status: euvolemic  Follow-up not needed.        Visit Vitals    /75    Pulse 77    Temp 36.6 °C (97.8 °F) (Oral)    Resp 18    Ht 5' 2" (1.575 m)    Wt 111.3 kg (245 lb 6 oz)    LMP 06/12/2016 (Exact Date)    SpO2 (!) 94%    Breastfeeding Unknown    BMI 44.88 kg/m2       Pain/Mariusz Score: Pain Rating Prior to Med Admin: 4 (3/23/2017  2:04 PM)  Pain Rating Post Med Admin: 0 (3/23/2017  3:00 PM)      "

## 2017-05-04 ENCOUNTER — POSTPARTUM VISIT (OUTPATIENT)
Dept: OBSTETRICS AND GYNECOLOGY | Facility: CLINIC | Age: 27
End: 2017-05-04
Attending: OBSTETRICS & GYNECOLOGY
Payer: COMMERCIAL

## 2017-05-04 VITALS
BODY MASS INDEX: 42.61 KG/M2 | HEIGHT: 62 IN | SYSTOLIC BLOOD PRESSURE: 110 MMHG | DIASTOLIC BLOOD PRESSURE: 76 MMHG | WEIGHT: 231.56 LBS

## 2017-05-04 PROCEDURE — 99999 PR PBB SHADOW E&M-EST. PATIENT-LVL II: CPT | Mod: PBBFAC,,, | Performed by: OBSTETRICS & GYNECOLOGY

## 2017-05-04 PROCEDURE — 0503F POSTPARTUM CARE VISIT: CPT | Mod: S$GLB,,, | Performed by: OBSTETRICS & GYNECOLOGY

## 2017-05-04 NOTE — PROGRESS NOTES
"Jyotsna Moss is a 26 y.o. female  presents for a postpartum visit.  She is status post vaginal delivery  6 weeks ago.  Her hospitalization was not complicated.  She is breastfeeding.  She desires no method for contraception.  She denies postpartum depression.    Her last pap was August     No current outpatient prescriptions on file.    Vitals:    17 1020   BP: 110/76   Weight: 105 kg (231 lb 9.5 oz)   Height: 5' 2" (1.575 m)     Body mass index is 42.36 kg/(m^2).    GENERAL: no distress  ABDOMEN: Normal, benign.  EXTERNAL GENITALIA POSTPARTUM: normal, well-healed, without lesions or masses   VAGINA POSTPARTUM: normal, well-healed, physiologic discharge, without lesions   CERVIX POSTPARTUM: normal, well-healed, without lesions   UTERUS POSTPARTUM: normal size, well involuted, firm, non-tender, ADNEXA POSTPARTUM: no masses palpable and nontender    Assessment:  Normal postpartum exam    Plan:    Postpartum care and examination    discussed birth control options  Moods stable return in August for a pap   Doing well with feeding     Routine follow up.  "

## 2017-05-04 NOTE — MR AVS SNAPSHOT
[facility]  [address], [address]  [facility] [address]  Phone: [phone]  Fax: [phone]                  Jyotsna Moss   2017 9:45 AM   Postpartum Visit    Description:  Female : 1990   Provider:  Alida Baldwin MD   Department:  [facility]           Reason for Visit     Postpartum Care           Diagnoses this Visit        Comments    Postpartum care and examination    -  Primary            To Do List           Goals (5 Years of Data)     None      Follow-Up and Disposition     Return in about 3 months (around 2017) for repeat pap.      [facility]     [facility] On Call Nurse Care Line -  Assistance  Unless otherwise directed by your provider, please contact [facility], our nurse care line that is available for  assistance.     Registered nurses in the [facility] provide: appointment scheduling, clinical advisement, health education, and other advisory services.  Call: [phone] (toll free)               Medications           Message regarding Medications     Verify the changes and/or additions to your medication regime listed below are the same as discussed with your clinician today.  If any of these changes or additions are incorrect, please notify your healthcare provider.        STOP taking these medications     doxylamine-pyridoxine (DICLEGIS) 10-10 mg TbEC Take 2 tablets by mouth once daily.    ibuprofen (ADVIL,MOTRIN) 600 MG tablet Take 1 tablet (600 mg total) by mouth every 6 (six) hours as needed.           Verify that the below list of medications is an accurate representation of the medications you are currently taking.  If none reported, the list may be blank. If incorrect, please contact your healthcare provider. Carry this list with you in case of emergency.           Current Medications            Clinical Reference Information           Prenatal Vitals     Enc. Date GA Prenatal Vitals Prenatal Pulse  "Pain Level Urine Albumin/Glucose Edema Presentation Dilation/Effacement/Station    5/4/17 39w1d 110/76 / 105 kg (231 lb 9.5 oz)           3/21/17 39w0d Admission Dept: [facility]    3/20/17 38w6d       Vertex 3 / 20    3/17/17 38w3d 124/72 / 110.1 kg (242 lb 11.6 oz) 43 cm / 140 / Present  0 Negative / Negative +1 / +1 Vertex 3 / 20 / -3    3/10/17 37w3d 114/76 / 111.7 kg (246 lb 5.8 oz) 40 cm / 140 / Present  0 Negative / Negative None / None /  / No Vertex 3 / 20 / -4    3/3/17 36w3d 118/70 / 111.7 kg (246 lb 5.8 oz) 40 cm / 144 / Present  0 Negative / Negative None / None Tr Breech 0    2/23/17 35w2d 122/70 / 111.1 kg (244 lb 14.9 oz) 40 cm / 152 / Present  0  None / None      2/17/17 34w3d 112/68 / 111.7 kg (246 lb 2.3 oz) 37 cm / 151 / Present  0 Negative / Negative None / None  0    2/3/17 32w3d 114/68 / 110.5 kg (243 lb 9.7 oz) 34 cm / 150 / Present  0 Negative / Negative None / None      1/18/17 30w1d 102/74 / 107.9 kg (237 lb 13.7 oz) 32 cm / 145 / Present  0 Negative / Negative None / None      12/30/16 27w3d 102/60 / 107.8 kg (237 lb 10.5 oz) 27 cm / 140 / Present  0 Negative / Negative None / None      12/14/16 25w1d 116/78 / 106 kg (233 lb 11 oz) 25 cm / 134 / Present  0 Negative / Negative None / None / None      11/16/16 21w1d 114/76 / 105 kg (231 lb 9.5 oz)  /  / Present  0 Negative / Negative None / None / None      10/27/16 18w2d 106/72 / 104 kg (229 lb 4.5 oz)  / 148 / Present  0 Negative / Negative None / None / None      10/7/16 15w3d 100/70 / 102.4 kg (225 lb 12 oz)  / + / Absent  0 Negative / Negative None / None / None      9/7/16 11w1d 112/70 / 102.1 kg (225 lb 1.4 oz)  / 145 / Absent  0 Negative / Negative None / None      8/19/16 9w5d 122/80 / 103.1 kg (227 lb 6.5 oz)  / 140 / Absent  0 Negative / Negative None / None         Number of babies: 1   Height: 5' 2" (1.575 m)       Your Vitals Were     BP Height Weight Last Period BMI    110/76 5' 2" (1.575 m) 105 kg (231 lb 9.5 oz) " 06/12/2016 (Exact Date) 42.36 kg/m2      Allergies as of 5/4/2017     No Known Allergies      Immunizations Administered on Date of Encounter - 5/4/2017     None      Language Assistance Services     ATTENTION: Language assistance services are available, free of charge. Please call [phone].      ATENCIÓN: Si shawn rick, tiene a spain disposición servicios gratuitos de asistencia lingüística. Llame al [phone].     CHÚ Ý: N?u b?n nói Ti?ng Vi?t, có các d?ch v? h? tr? ngôn ng? mi?n phí dành cho b?n. G?i s? [phone].         [facility] complies with applicable Federal civil rights laws and does not discriminate on the basis of race, color, national origin, age, disability, or sex.

## 2017-09-22 ENCOUNTER — TELEPHONE (OUTPATIENT)
Dept: PHARMACY | Facility: CLINIC | Age: 27
End: 2017-09-22

## 2017-09-22 ENCOUNTER — TELEPHONE (OUTPATIENT)
Dept: OBSTETRICS AND GYNECOLOGY | Facility: CLINIC | Age: 27
End: 2017-09-22

## 2017-09-22 NOTE — TELEPHONE ENCOUNTER
Informed patient [facility] received a prescription for Mirena and that we will verify with the insurance company that patient provided if she has active coverage and what is her processing information . We will update patient of status as more information is received.

## 2017-10-16 ENCOUNTER — TELEPHONE (OUTPATIENT)
Dept: OBSTETRICS AND GYNECOLOGY | Facility: CLINIC | Age: 27
End: 2017-10-16

## 2017-10-17 ENCOUNTER — PROCEDURE VISIT (OUTPATIENT)
Dept: OBSTETRICS AND GYNECOLOGY | Facility: CLINIC | Age: 27
End: 2017-10-17
Payer: COMMERCIAL

## 2017-10-17 VITALS
DIASTOLIC BLOOD PRESSURE: 80 MMHG | BODY MASS INDEX: 45.72 KG/M2 | WEIGHT: 248.44 LBS | HEIGHT: 62 IN | SYSTOLIC BLOOD PRESSURE: 112 MMHG

## 2017-10-17 DIAGNOSIS — Z30.430 ENCOUNTER FOR IUD INSERTION: Primary | ICD-10-CM

## 2017-10-17 DIAGNOSIS — Z01.812 PRE-PROCEDURE LAB EXAM: ICD-10-CM

## 2017-10-17 PROCEDURE — 58300 INSERT INTRAUTERINE DEVICE: CPT | Mod: S$GLB,,, | Performed by: OBSTETRICS & GYNECOLOGY

## 2017-10-17 NOTE — PROCEDURES
Procedures   IUD INSERT    REASON FOR VISIT    Contraceptive management.        CURRENT MEDICATION     Intra-uterine device      TESTS   Laboratory-based Chemistry:  Urine Tests:    An HCG pregnancy test was negative.      ASSESSMENT     Pregnancy test was negative     Insertion of intrauterine device (IUD)       PLAN     Follow-up for re-examination in 6 weeks to assess Mirena strings       THERAPY     Brief operative note free text: Bimanual exam confirmed uterine position.  Vaginal speculum inserted.  Cervix prepped with betadine.  Cervix grasped with tenaculum at 12 o'clock.  Mirena device fitted to sounded depth and inserted without difficulty.   IUD strings cut 3-4 cm from cervical os.  Tenaculum removed.  Tenaculum sites cauterized with nitrous stick.      COUNSELING/EDUCATION     Pre-procedure checklist: informed consent was not obtained Patient was counseled of risks to procedure including but not limited to pain, bleeding, infection, IUD perforation requiring abdominal surgery for removal, explusion, migration, need for hysteroscopic removal, pregnancy and risk of ectopic. Patient consents and signed Mirena form.    Counseling lasted approximately 15 minutes and all her questions were answered.

## 2017-11-29 ENCOUNTER — OFFICE VISIT (OUTPATIENT)
Dept: OBSTETRICS AND GYNECOLOGY | Facility: CLINIC | Age: 27
End: 2017-11-29
Attending: OBSTETRICS & GYNECOLOGY
Payer: COMMERCIAL

## 2017-11-29 VITALS
DIASTOLIC BLOOD PRESSURE: 78 MMHG | SYSTOLIC BLOOD PRESSURE: 102 MMHG | HEIGHT: 62 IN | WEIGHT: 255.63 LBS | BODY MASS INDEX: 47.04 KG/M2

## 2017-11-29 DIAGNOSIS — Z30.431 IUD CHECK UP: Primary | ICD-10-CM

## 2017-11-29 PROCEDURE — 99212 OFFICE O/P EST SF 10 MIN: CPT | Mod: S$GLB,,, | Performed by: OBSTETRICS & GYNECOLOGY

## 2017-11-29 PROCEDURE — 99999 PR PBB SHADOW E&M-EST. PATIENT-LVL III: CPT | Mod: PBBFAC,,, | Performed by: OBSTETRICS & GYNECOLOGY

## 2017-11-29 NOTE — PROGRESS NOTES
"CC: IUD Check    Jyotsna Moss is a 26 y.o. female  presents for an IUD Check.  She is established.      HPI:  Mirena placed 6 weeks ago.  No pains but occasional irregular spotting. No pain with intercourse.  Pap up to date     History reviewed. No pertinent past medical history.    History reviewed. No pertinent surgical history.    OB History    Para Term  AB Living   2 2 1     1   SAB TAB Ectopic Multiple Live Births         0 1      # Outcome Date GA Lbr Rehan/2nd Weight Sex Delivery Anes PTL Lv   2 Term 17 39w1d  3.79 kg (8 lb 5.7 oz) M Vag-Spont EPI N    1 Para 13    F Vag-Spont   DANIE          Family History   Problem Relation Age of Onset    Diabetes Paternal Grandfather     Breast cancer Maternal Grandmother     Miscarriages / Stillbirths Cousin      labor Cousin     Colon cancer Neg Hx     Eclampsia Neg Hx     Hypertension Neg Hx     Ovarian cancer Neg Hx     Stroke Neg Hx        Social History   Substance Use Topics    Smoking status: Never Smoker    Smokeless tobacco: Never Used    Alcohol use Yes       /78   Ht 5' 2" (1.575 m)   Wt 115.9 kg (255 lb 10 oz)   BMI 46.75 kg/m²     ROS:  Review of Systems   Constitutional: Negative.    Gastrointestinal: Negative.    Genitourinary: Positive for menstrual problem. Negative for genital sores, pelvic pain, vaginal bleeding, vaginal discharge and vaginal pain.        No pains with intercourse       Physical Exam:  Physical Exam:   Constitutional: No distress.             Abdominal: There is no tenderness.     Genitourinary: Vagina normal and uterus normal. No vaginal discharge found. Additional cervical findings: IUD strings visualized (at level of the external os)  Genitourinary Comments: iud in place   iud strings seen and in normal position                     ASSESSMENT AND PLAN    Jyotsna was seen today for iud check.    Diagnoses and all orders for this visit:    IUD check up    discussed options for " removal and replacement for strings but since palpated patient wants to leave in place due to no current problems       Patient was counseled today on A.C.S. Pap guidelines and recommendations for yearly pelvic exams, mammograms and monthly self breast exams; to see her PCP for other health maintenance.     Return in about 1 year (around 11/29/2018).

## 2018-01-16 ENCOUNTER — OFFICE VISIT (OUTPATIENT)
Dept: PRIMARY CARE CLINIC | Facility: CLINIC | Age: 28
End: 2018-01-16
Payer: COMMERCIAL

## 2018-01-16 VITALS
RESPIRATION RATE: 18 BRPM | DIASTOLIC BLOOD PRESSURE: 78 MMHG | SYSTOLIC BLOOD PRESSURE: 114 MMHG | HEART RATE: 85 BPM | TEMPERATURE: 98 F | HEIGHT: 62 IN | OXYGEN SATURATION: 97 % | WEIGHT: 262 LBS | BODY MASS INDEX: 48.21 KG/M2

## 2018-01-16 DIAGNOSIS — E66.9 OBESITY, UNSPECIFIED CLASSIFICATION, UNSPECIFIED OBESITY TYPE, UNSPECIFIED WHETHER SERIOUS COMORBIDITY PRESENT: ICD-10-CM

## 2018-01-16 DIAGNOSIS — F41.9 ANXIETY: Primary | ICD-10-CM

## 2018-01-16 DIAGNOSIS — F32.A DEPRESSION, UNSPECIFIED DEPRESSION TYPE: ICD-10-CM

## 2018-01-16 PROCEDURE — 99213 OFFICE O/P EST LOW 20 MIN: CPT | Mod: S$GLB,,, | Performed by: FAMILY MEDICINE

## 2018-01-16 PROCEDURE — 99999 PR PBB SHADOW E&M-EST. PATIENT-LVL IV: CPT | Mod: PBBFAC,,, | Performed by: FAMILY MEDICINE

## 2018-01-16 RX ORDER — LORAZEPAM 0.5 MG/1
0.5 TABLET ORAL EVERY 6 HOURS PRN
Qty: 30 TABLET | Refills: 1 | Status: SHIPPED | OUTPATIENT
Start: 2018-01-16 | End: 2018-06-14

## 2018-01-16 RX ORDER — CITALOPRAM 20 MG/1
20 TABLET, FILM COATED ORAL DAILY
Qty: 30 TABLET | Refills: 11 | Status: SHIPPED | OUTPATIENT
Start: 2018-01-16 | End: 2019-02-14 | Stop reason: SDUPTHER

## 2018-01-16 NOTE — PROGRESS NOTES
Subjective:       Patient ID: Jyotsna Moss is a 27 y.o. female.    Chief Complaint: Anxiety (Pt. c/o anxiety, SOB, hyperventalting, and heart racing due to new changes at home. ) and Depression (Moods have been causing problems at home. No thoughts of hurting self or others. )    HPI: 27-year-old white female in for anxiety/depression-sites new changes at home.  Patient lives with  and 2 children 4 and 9 months.  Patient states is always had some anxiety but is been able to handle it by going outside taking deep breaths etc.  Never diagnosed with anxiety or depression.  + Crying spells especially at night.  Patient watches her complications out of her house.   is a --doing okay--arguing more often but patient feels due to her anxiety and stress--even taking out the garbage.  Daughter 4 years old picky eater.  Son getting much more mobile and getting into things.  No new family.  Used to work in a  better with her working at home.  Finances okay always has trouble.Sex not always the best to tries.  Not as often as she would like it to be.  A few months ago  was diagnosed with anxiety and depression-on Zoloft and Vistaril daily all since he is a .     ROS:  Skin: no psoriasis, eczema, skin cancer  HEENT: No headache, ocular pain, blurred vision, diplopia, epistaxis, hoarseness change in voice, thyroid trouble  Lung: No pneumonia, asthma, Tb, wheezing, SOB, no smoking  Heart: No chest pain, ankle edema, +palpitations--with anxiety,no MI, monse murmur, hypertension, hyperlipidemia  Abdomen: No nausea, vomiting, diarrhea, constipation, ulcers, hepatitis, gallbladder disease, melena, hematochezia, hematemesis  : no UTI, renal disease, stones  Gyn LMP THURSDAY OR FRIDAY LAST WEEK -Has Joe  Just got in Oct   MS: no fractures, O/A, lupus, rheumatoid, gout  Neuro: No dizziness, LOC, seizures   No diabetes, no anemia, + anxiety, + depression     Objective:    Physical Exam:  General: Well nourished, well developed, no acute distress + obese   HEENT: Eyes PERRLA, EOM intact, nose patent, throat non-erythematous ears TM cl   NECK: Supple, no bruits, No JVD, no nodes  Lungs: Clear, no rales, rhonchi, wheezing  Heart: Regular rate and rhythm, no murmurs, gallops, or rubs  Abdomen: flat, bowel sounds positive, no tenderness, or organomegaly  MS: Range of motion and muscle strength intact  Neuro: Alert, CN intact, oriented X 3  Extremities: No cyanosis, clubbing, or edema         Assessment:       1. Anxiety    2. Depression, unspecified depression type    3. Obesity, unspecified classification, unspecified obesity type, unspecified whether serious comorbidity present        Plan:       Anxiety  -     CBC auto differential; Future; Expected date: 01/16/2018  -     Comprehensive metabolic panel; Future; Expected date: 01/16/2018  -     Lipid panel; Future; Expected date: 01/16/2018    Depression, unspecified depression type    Obesity, unspecified classification, unspecified obesity type, unspecified whether serious comorbidity present    Other orders  -     citalopram (CELEXA) 20 MG tablet; Take 1 tablet (20 mg total) by mouth once daily.  Dispense: 30 tablet; Refill: 11  -     LORazepam (ATIVAN) 0.5 MG tablet; Take 1 tablet (0.5 mg total) by mouth every 6 (six) hours as needed for Anxiety.  Dispense: 30 tablet; Refill: 1      Consider change birth control if S&S stay on BC pills-- go Religion, learn yoga, exercise.

## 2018-01-19 ENCOUNTER — TELEPHONE (OUTPATIENT)
Dept: OBSTETRICS AND GYNECOLOGY | Facility: CLINIC | Age: 28
End: 2018-01-19

## 2018-01-19 NOTE — TELEPHONE ENCOUNTER
Nicolasa pt states she has been experiencing anxiety and depression. Pt went to her PCP who suggested that it might have something to do with her David.   [phone]

## 2018-01-19 NOTE — TELEPHONE ENCOUNTER
Pt went to PCP because she has been having anxiety and depression.  More anxiety than depression.  PCP thinks Mirena is the cause of it.  She also reports acne. Pt had IUD inserted in October. Denies SI and HI.  She was prescribed by Celexa and Ativan by PCP.  Advised she see Dermatologist about acne.

## 2018-01-22 ENCOUNTER — TELEPHONE (OUTPATIENT)
Dept: OBSTETRICS AND GYNECOLOGY | Facility: CLINIC | Age: 28
End: 2018-01-22

## 2018-04-23 ENCOUNTER — TELEPHONE (OUTPATIENT)
Dept: OBSTETRICS AND GYNECOLOGY | Facility: CLINIC | Age: 28
End: 2018-04-23

## 2018-04-23 NOTE — TELEPHONE ENCOUNTER
Nicolasa pt calling, wants her IUD removed only. Please check benefits and sched removal.Or let me know and I will call her back to sched.Pt # [phone]

## 2018-05-17 ENCOUNTER — LAB VISIT (OUTPATIENT)
Dept: LAB | Facility: OTHER | Age: 28
End: 2018-05-17
Payer: COMMERCIAL

## 2018-05-17 ENCOUNTER — PROCEDURE VISIT (OUTPATIENT)
Dept: OBSTETRICS AND GYNECOLOGY | Facility: CLINIC | Age: 28
End: 2018-05-17
Attending: OBSTETRICS & GYNECOLOGY
Payer: COMMERCIAL

## 2018-05-17 VITALS
WEIGHT: 279 LBS | SYSTOLIC BLOOD PRESSURE: 106 MMHG | HEIGHT: 62 IN | DIASTOLIC BLOOD PRESSURE: 78 MMHG | BODY MASS INDEX: 51.34 KG/M2

## 2018-05-17 DIAGNOSIS — Z30.432 ENCOUNTER FOR IUD REMOVAL: Primary | ICD-10-CM

## 2018-05-17 DIAGNOSIS — F41.9 ANXIETY: ICD-10-CM

## 2018-05-17 DIAGNOSIS — R53.83 FATIGUE, UNSPECIFIED TYPE: ICD-10-CM

## 2018-05-17 DIAGNOSIS — R22.30 AXILLARY MASS, UNSPECIFIED LATERALITY: ICD-10-CM

## 2018-05-17 LAB
ALBUMIN SERPL BCP-MCNC: 3.8 G/DL
ALP SERPL-CCNC: 60 U/L
ALT SERPL W/O P-5'-P-CCNC: 18 U/L
ANION GAP SERPL CALC-SCNC: 10 MMOL/L
AST SERPL-CCNC: 17 U/L
BASOPHILS # BLD AUTO: 0.01 K/UL
BASOPHILS NFR BLD: 0.1 %
BILIRUB SERPL-MCNC: 0.7 MG/DL
BUN SERPL-MCNC: 11 MG/DL
CALCIUM SERPL-MCNC: 9.3 MG/DL
CHLORIDE SERPL-SCNC: 110 MMOL/L
CHOLEST SERPL-MCNC: 168 MG/DL
CHOLEST/HDLC SERPL: 3.4 {RATIO}
CO2 SERPL-SCNC: 19 MMOL/L
CREAT SERPL-MCNC: 0.8 MG/DL
DIFFERENTIAL METHOD: NORMAL
EOSINOPHIL # BLD AUTO: 0.1 K/UL
EOSINOPHIL NFR BLD: 1.3 %
ERYTHROCYTE [DISTWIDTH] IN BLOOD BY AUTOMATED COUNT: 12.5 %
EST. GFR  (AFRICAN AMERICAN): >60 ML/MIN/1.73 M^2
EST. GFR  (NON AFRICAN AMERICAN): >60 ML/MIN/1.73 M^2
GLUCOSE SERPL-MCNC: 77 MG/DL
HCT VFR BLD AUTO: 42.8 %
HDLC SERPL-MCNC: 49 MG/DL
HDLC SERPL: 29.2 %
HGB BLD-MCNC: 14.6 G/DL
LDLC SERPL CALC-MCNC: 101.8 MG/DL
LYMPHOCYTES # BLD AUTO: 2.2 K/UL
LYMPHOCYTES NFR BLD: 23.3 %
MCH RBC QN AUTO: 30.4 PG
MCHC RBC AUTO-ENTMCNC: 34.1 G/DL
MCV RBC AUTO: 89 FL
MONOCYTES # BLD AUTO: 0.5 K/UL
MONOCYTES NFR BLD: 5.2 %
NEUTROPHILS # BLD AUTO: 6.6 K/UL
NEUTROPHILS NFR BLD: 69.9 %
NONHDLC SERPL-MCNC: 119 MG/DL
PLATELET # BLD AUTO: 282 K/UL
PMV BLD AUTO: 10.2 FL
POTASSIUM SERPL-SCNC: 4.1 MMOL/L
PROT SERPL-MCNC: 7.1 G/DL
RBC # BLD AUTO: 4.8 M/UL
SODIUM SERPL-SCNC: 139 MMOL/L
TRIGL SERPL-MCNC: 86 MG/DL
TSH SERPL DL<=0.005 MIU/L-ACNC: 0.88 UIU/ML
WBC # BLD AUTO: 9.43 K/UL

## 2018-05-17 PROCEDURE — 85025 COMPLETE CBC W/AUTO DIFF WBC: CPT

## 2018-05-17 PROCEDURE — 80061 LIPID PANEL: CPT

## 2018-05-17 PROCEDURE — 36415 COLL VENOUS BLD VENIPUNCTURE: CPT

## 2018-05-17 PROCEDURE — 84443 ASSAY THYROID STIM HORMONE: CPT

## 2018-05-17 PROCEDURE — 80053 COMPREHEN METABOLIC PANEL: CPT

## 2018-05-18 PROBLEM — Z34.90 PREGNANCY: Status: RESOLVED | Noted: 2017-03-21 | Resolved: 2018-05-18

## 2018-05-18 PROBLEM — O36.60X0 EXCESSIVE FETAL GROWTH, LARGE FOR DATES, ANTEPARTUM: Status: RESOLVED | Noted: 2017-02-23 | Resolved: 2018-05-18

## 2018-05-18 RX ORDER — NORGESTIMATE AND ETHINYL ESTRADIOL 0.25-0.035
1 KIT ORAL DAILY
Qty: 30 TABLET | Refills: 11 | Status: SHIPPED | OUTPATIENT
Start: 2018-05-18 | End: 2020-04-13 | Stop reason: SDUPTHER

## 2018-05-18 NOTE — PROCEDURES
Procedures   Removal     REASON FOR VISIT    Contraceptive management.      CURRENT MEDICATION     Intra-uterine device        TESTS   Laboratory-based Chemistry:  Urine Tests:      ASSESSMENT     Mirena removal   Contraception management       PLAN       IUD removal    THERAPY     Brief operative note free text: Bimanual exam confirmed uterine position.  Vaginal speculum inserted.  Cervix prepped with betadine.  Inability to remove in clinic room.  Patient moved to ultrasound room to confirm position of the IUD.  With ultrasound guidance IUD removed.  Birth control options discussed decided on ortho tri cyclen        COUNSELING/EDUCATION     Pre-procedure checklist: informed consent was not obtained Patient was counseled of risks to procedure including but not limited to pain, bleeding, infection, IUD perforation requiring abdominal surgery for removal, explusion, migration, need for hysteroscopic removal, pregnancy and risk of ectopic. Patient consents and signed Mirena form

## 2018-05-22 ENCOUNTER — TELEPHONE (OUTPATIENT)
Dept: OBSTETRICS AND GYNECOLOGY | Facility: CLINIC | Age: 28
End: 2018-05-22

## 2018-06-14 ENCOUNTER — OFFICE VISIT (OUTPATIENT)
Dept: PRIMARY CARE CLINIC | Facility: CLINIC | Age: 28
End: 2018-06-14
Payer: COMMERCIAL

## 2018-06-14 VITALS
HEART RATE: 80 BPM | TEMPERATURE: 97 F | OXYGEN SATURATION: 98 % | BODY MASS INDEX: 50.61 KG/M2 | HEIGHT: 62 IN | WEIGHT: 275 LBS | RESPIRATION RATE: 18 BRPM | SYSTOLIC BLOOD PRESSURE: 108 MMHG | DIASTOLIC BLOOD PRESSURE: 74 MMHG

## 2018-06-14 DIAGNOSIS — E66.9 OBESITY, UNSPECIFIED CLASSIFICATION, UNSPECIFIED OBESITY TYPE, UNSPECIFIED WHETHER SERIOUS COMORBIDITY PRESENT: ICD-10-CM

## 2018-06-14 DIAGNOSIS — J06.9 UPPER RESPIRATORY TRACT INFECTION, UNSPECIFIED TYPE: ICD-10-CM

## 2018-06-14 DIAGNOSIS — F32.A DEPRESSION, UNSPECIFIED DEPRESSION TYPE: ICD-10-CM

## 2018-06-14 DIAGNOSIS — S39.012A LUMBOSACRAL STRAIN, INITIAL ENCOUNTER: Primary | ICD-10-CM

## 2018-06-14 DIAGNOSIS — F41.9 ANXIETY: ICD-10-CM

## 2018-06-14 DIAGNOSIS — M54.50 LOW BACK PAIN, NON-SPECIFIC: ICD-10-CM

## 2018-06-14 PROCEDURE — 99999 PR PBB SHADOW E&M-EST. PATIENT-LVL IV: CPT | Mod: PBBFAC,,, | Performed by: FAMILY MEDICINE

## 2018-06-14 PROCEDURE — 96372 THER/PROPH/DIAG INJ SC/IM: CPT | Mod: S$GLB,,, | Performed by: FAMILY MEDICINE

## 2018-06-14 PROCEDURE — 99213 OFFICE O/P EST LOW 20 MIN: CPT | Mod: 25,S$GLB,, | Performed by: FAMILY MEDICINE

## 2018-06-14 RX ORDER — PROMETHAZINE HYDROCHLORIDE AND CODEINE PHOSPHATE 6.25; 1 MG/5ML; MG/5ML
5 SOLUTION ORAL EVERY 6 HOURS PRN
Qty: 180 ML | Refills: 0 | Status: SHIPPED | OUTPATIENT
Start: 2018-06-14 | End: 2019-06-11

## 2018-06-14 RX ORDER — IBUPROFEN 600 MG/1
600 TABLET ORAL 3 TIMES DAILY
Qty: 60 TABLET | Refills: 5 | Status: SHIPPED | OUTPATIENT
Start: 2018-06-14 | End: 2019-06-11

## 2018-06-14 RX ORDER — HYDROCODONE BITARTRATE AND ACETAMINOPHEN 5; 325 MG/1; MG/1
1 TABLET ORAL EVERY 6 HOURS PRN
Qty: 30 TABLET | Refills: 0 | Status: SHIPPED | OUTPATIENT
Start: 2018-06-14 | End: 2019-06-11

## 2018-06-14 RX ORDER — BETAMETHASONE SODIUM PHOSPHATE AND BETAMETHASONE ACETATE 3; 3 MG/ML; MG/ML
12 INJECTION, SUSPENSION INTRA-ARTICULAR; INTRALESIONAL; INTRAMUSCULAR; SOFT TISSUE
Status: COMPLETED | OUTPATIENT
Start: 2018-06-14 | End: 2018-06-14

## 2018-06-14 RX ORDER — AZITHROMYCIN 250 MG/1
TABLET, FILM COATED ORAL
Qty: 6 TABLET | Refills: 0 | Status: SHIPPED | OUTPATIENT
Start: 2018-06-14 | End: 2018-06-18

## 2018-06-14 RX ADMIN — BETAMETHASONE SODIUM PHOSPHATE AND BETAMETHASONE ACETATE 12 MG: 3; 3 INJECTION, SUSPENSION INTRA-ARTICULAR; INTRALESIONAL; INTRAMUSCULAR; SOFT TISSUE at 02:06

## 2018-06-14 NOTE — PROGRESS NOTES
Subjective:       Patient ID: Jyotsna Moss is a 27 y.o. female.    Chief Complaint: Back Pain    HPI: 27-year-old female in for back pain--6:45 AM patient was cough--felt a pop--pain is from approximately Y13-Q4--gxumpr in the right side.  Hurts to bend, lift, squat.  Has initially sits is sore but then the pain past.  No prior back injuries no lupus rheumatoid gout osteoarthritis or fractures.       No fever, + sinus trouble, no sore throat, + cough, + phlegm== green no smoking--LMP 2 weeks ago.    ROS:  Skin: no psoriasis, eczema, skin cancer  HEENT: No headache, ocular pain, blurred vision, diplopia, epistaxis, +hoarseness +change in voice,no  thyroid trouble  Lung: No pneumonia, asthma, Tb, wheezing, SOB,No smoking   Heart: No chest pain, ankle edema, palpitations, MI, monse murmur, hypertension, hyperlipidemia  Abdomen: No nausea, vomiting, diarrhea, constipation, ulcers, hepatitis, gallbladder disease, melena, hematochezia, hematemesis  : no UTI, renal disease, stones  GYN have Mirena removed--1 BC pills last menstrual period about 2 weeks  MS: no fractures, O/A, lupus, rheumatoid, gout--see history of present illness   Neuro: No dizziness, LOC, seizures   No diabetes, no anemia, + anxiety, + depression    , 2 children, watched children at home    Objective:   Physical Exam:  General: Well nourished, well developed, no acute distress + obesity  Skin: No lesions  HEENT: Eyes PERRLA, EOM intact, nose patent, throat non-erythematous  ears TM clear   NECK: Supple, no bruits, No JVD, no nodes  Lungs: Clear, no rales, rhonchi, wheezing + coarse cough   Heart: Regular rate and rhythm, no murmurs, gallops, or rubs  Abdomen: flat, bowel sounds positive, no tenderness, or organomegaly  MS: tenderness lumbar spine L1 S1 bilaterally right greater than left anterior flexion 10° extension 10° left flexion and rotation 10° straight leg lift pulling sensation in the back no radiculopathy reflexes +2 over 4    Neuro: Alert, CN intact, oriented X 3  Extremities: No cyanosis, clubbing, or edema         Assessment:       1. Lumbosacral strain, initial encounter    2. Upper respiratory tract infection, unspecified type    3. Obesity, unspecified classification, unspecified obesity type, unspecified whether serious comorbidity present    4. Depression, unspecified depression type    5. Anxiety    6. Low back pain, non-specific        Plan:       Lumbosacral strain, initial encounter    Upper respiratory tract infection, unspecified type    Obesity, unspecified classification, unspecified obesity type, unspecified whether serious comorbidity present    Depression, unspecified depression type    Anxiety    Low back pain, non-specific       ice ×48 hours then heat--moist heating pad/Theragesic/range of motion exercise   Once well needs to exercise decrease weight   Celestone/Zithromax/Medrol/Phenergan With Codeine for cold   Celestone/Norco/Medrol--ibuprofen for back pain   X-ray lumbar spine   Patient with anxiety and depression on Celexa and Ativan

## 2018-06-14 NOTE — PROGRESS NOTES
Patient ID by name and . NKDA. Celestone 12 mg IM injection given in right ventrogluteal using aseptic technique. Aspirated with no blood noted. Patient tolerated well. Given per physicians order. No adverse reaction noted.

## 2019-02-15 RX ORDER — CITALOPRAM 20 MG/1
TABLET, FILM COATED ORAL
Qty: 30 TABLET | Refills: 2 | Status: SHIPPED | OUTPATIENT
Start: 2019-02-15 | End: 2019-06-11

## 2019-05-17 NOTE — MR AVS SNAPSHOT
[facility], [place]  [address]  [address]  [facility] [address]  Phone: [phone]  Fax: [phone]                  Jyotsna Moss   3/17/2017 9:45 AM   Routine Prenatal    Description:  Female : 1990   Provider:  Antoinette Mckeon MD   Department:  [facility], [place]           Reason for Visit     Routine Prenatal Visit                To Do List           Future Appointments        Provider Department Dept Phone    3/20/2017 1:30 PM [facility], [facility] [phone]      Goals (5 Years of Data)     None      [facility]     [facility] -  Assistance  Registered nurses in the [facility] provide clinical advisement, health education, appointment booking, and other advisory services.  Call for this free service at [phone].             Medications           Message regarding Medications     Verify the changes and/or additions to your medication regime listed below are the same as discussed with your clinician today.  If any of these changes or additions are incorrect, please notify your healthcare provider.             Verify that the below list of medications is an accurate representation of the medications you are currently taking.  If none reported, the list may be blank. If incorrect, please contact your healthcare provider. Carry this list with you in case of emergency.           Current Medications     doxylamine-pyridoxine (DICLEGIS) 10-10 mg TbEC Take 2 tablets by mouth once daily.    famotidine (PEPCID) 20 MG tablet Take 1 tablet (20 mg total) by mouth nightly as needed for Heartburn.           Clinical Reference Information           Prenatal Vitals     Enc. Date GA Prenatal Vitals Prenatal Pulse Pain Level Urine Albumin/Glucose Edema Presentation Dilation/Effacement/Station    3/17/17 38w3d 124/72 / 110.1 kg (242 lb 11.6 oz)    Negative / Negative       3/10/17 37w3d 114/76 / 111.7 kg (246 lb  5.8 oz) 40 cm / 140 / Present  0 Negative / Negative None / None /  / No Vertex 3 / 20 / -4    3/3/17 36w3d 118/70 / 111.7 kg (246 lb 5.8 oz) 40 cm / 144 / Present  0 Negative / Negative None / None Tr Breech 0    2/23/17 35w2d 122/70 / 111.1 kg (244 lb 14.9 oz) 40 cm / 152 / Present  0  None / None      2/17/17 34w3d 112/68 / 111.7 kg (246 lb 2.3 oz) 37 cm / 151 / Present  0 Negative / Negative None / None  0    2/3/17 32w3d 114/68 / 110.5 kg (243 lb 9.7 oz) 34 cm / 150 / Present  0 Negative / Negative None / None      1/18/17 30w1d 102/74 / 107.9 kg (237 lb 13.7 oz) 32 cm / 145 / Present  0 Negative / Negative None / None      12/30/16 27w3d 102/60 / 107.8 kg (237 lb 10.5 oz) 27 cm / 140 / Present  0 Negative / Negative None / None      12/14/16 25w1d 116/78 / 106 kg (233 lb 11 oz) 25 cm / 134 / Present  0 Negative / Negative None / None / None      11/16/16 21w1d 114/76 / 105 kg (231 lb 9.5 oz)  /  / Present  0 Negative / Negative None / None / None      10/27/16 18w2d 106/72 / 104 kg (229 lb 4.5 oz)  / 148 / Present  0 Negative / Negative None / None / None      10/7/16 15w3d 100/70 / 102.4 kg (225 lb 12 oz)  / + / Absent  0 Negative / Negative None / None / None      9/7/16 11w1d 112/70 / 102.1 kg (225 lb 1.4 oz)  / 145 / Absent  0 Negative / Negative None / None      8/19/16 9w5d 122/80 / 103.1 kg (227 lb 6.5 oz)  / 140 / Absent  0 Negative / Negative None / None         Number of fetuses: 1       Your Vitals Were     BP Weight Last Period BMI       124/72 110.1 kg (242 lb 11.6 oz) 06/12/2016 (Exact Date) 44.4 kg/m2       Allergies as of 3/17/2017     No Known Allergies      Immunizations Administered on Date of Encounter - 3/17/2017     None      Language Assistance Services     ATTENTION: Language assistance services are available, free of charge. Please call [phone].      ATENCIÓN: Si habla alyssaañol, tiene a spain disposición servicios gratuitos de asistencia lingüística. Llame al  [phone].     DENEEN Ý: N?u b?n nói Ti?ng Vi?t, có các d?ch v? h? tr? ngôn ng? mi?n phí dành cho b?n. G?i s? [phone].         [facility] complies with applicable Federal civil rights laws and does not discriminate on the basis of race, color, national origin, age, disability, or sex.         02-Aug-2018

## 2019-05-29 ENCOUNTER — TELEPHONE (OUTPATIENT)
Dept: OBSTETRICS AND GYNECOLOGY | Facility: CLINIC | Age: 29
End: 2019-05-29

## 2019-05-29 NOTE — TELEPHONE ENCOUNTER
Nicolasa jauregui who is scheduled for her annual appt 7/9/19 but is concerned about a skin tag she has on her labia. States it's not causing her any pain, just bothersome. Was concerned if she should come in earlier to have it checked or wait until her annual appt to discuss and have it looked at.

## 2019-05-29 NOTE — TELEPHONE ENCOUNTER
Pt has had what appears to be a skin tag on labia minora for about a month.  Its not painful but bothersome.  Requesting to be seen and possibly have it removed.  Scheduled with Dr. Baldwin.

## 2019-06-11 ENCOUNTER — OFFICE VISIT (OUTPATIENT)
Dept: OBSTETRICS AND GYNECOLOGY | Facility: CLINIC | Age: 29
End: 2019-06-11
Payer: COMMERCIAL

## 2019-06-11 VITALS
BODY MASS INDEX: 50.43 KG/M2 | HEIGHT: 62 IN | SYSTOLIC BLOOD PRESSURE: 112 MMHG | WEIGHT: 274.06 LBS | DIASTOLIC BLOOD PRESSURE: 70 MMHG

## 2019-06-11 DIAGNOSIS — L91.8 SKIN TAG: Primary | ICD-10-CM

## 2019-06-11 PROCEDURE — 88342 TISSUE SPECIMEN TO PATHOLOGY, OBSTETRICS/GYNECOLOGY: ICD-10-PCS | Mod: 26,,, | Performed by: PATHOLOGY

## 2019-06-11 PROCEDURE — 99999 PR PBB SHADOW E&M-EST. PATIENT-LVL III: CPT | Mod: PBBFAC,,, | Performed by: OBSTETRICS & GYNECOLOGY

## 2019-06-11 PROCEDURE — 99499 NO LOS: ICD-10-PCS | Mod: S$GLB,,, | Performed by: OBSTETRICS & GYNECOLOGY

## 2019-06-11 PROCEDURE — 88342 IMHCHEM/IMCYTCHM 1ST ANTB: CPT | Mod: 26,,, | Performed by: PATHOLOGY

## 2019-06-11 PROCEDURE — 88305 TISSUE EXAM BY PATHOLOGIST: CPT | Mod: 59 | Performed by: PATHOLOGY

## 2019-06-11 PROCEDURE — 99499 UNLISTED E&M SERVICE: CPT | Mod: S$GLB,,, | Performed by: OBSTETRICS & GYNECOLOGY

## 2019-06-11 PROCEDURE — 11200 RMVL SKIN TAGS UP TO&INC 15: CPT | Mod: S$GLB,,, | Performed by: OBSTETRICS & GYNECOLOGY

## 2019-06-11 PROCEDURE — 88305 TISSUE SPECIMEN TO PATHOLOGY, OBSTETRICS/GYNECOLOGY: ICD-10-PCS | Mod: 26,,, | Performed by: PATHOLOGY

## 2019-06-11 PROCEDURE — 99999 PR PBB SHADOW E&M-EST. PATIENT-LVL III: ICD-10-PCS | Mod: PBBFAC,,, | Performed by: OBSTETRICS & GYNECOLOGY

## 2019-06-11 PROCEDURE — 11200 PR REMOVAL OF SKIN TAGS, UP TO 15: ICD-10-PCS | Mod: S$GLB,,, | Performed by: OBSTETRICS & GYNECOLOGY

## 2019-06-12 NOTE — PROCEDURES
Procedures\  Patient presents for a left labial skin tag to be removed.  Bothersome with wiping and has been growing over the past month.  No pain or bleeding.  Risk and benefits discussed and consents signed.    Lidocaine injected and skin tag removed with scissors and sent to pathology.    5 mm mole noted on same side removed with punch biopsy and placed in separate container.

## 2020-04-13 DIAGNOSIS — Z30.9 ENCOUNTER FOR CONTRACEPTIVE MANAGEMENT, UNSPECIFIED TYPE: Primary | ICD-10-CM

## 2020-04-14 RX ORDER — NORGESTIMATE AND ETHINYL ESTRADIOL 0.25-0.035
1 KIT ORAL DAILY
Qty: 30 TABLET | Refills: 4 | Status: SHIPPED | OUTPATIENT
Start: 2020-04-14 | End: 2020-09-28

## 2020-10-05 ENCOUNTER — PATIENT MESSAGE (OUTPATIENT)
Dept: ADMINISTRATIVE | Facility: HOSPITAL | Age: 30
End: 2020-10-05

## 2020-10-10 ENCOUNTER — OFFICE VISIT (OUTPATIENT)
Dept: URGENT CARE | Facility: CLINIC | Age: 30
End: 2020-10-10
Payer: MEDICAID

## 2020-10-10 VITALS
HEIGHT: 62 IN | OXYGEN SATURATION: 100 % | SYSTOLIC BLOOD PRESSURE: 127 MMHG | DIASTOLIC BLOOD PRESSURE: 59 MMHG | BODY MASS INDEX: 50.42 KG/M2 | HEART RATE: 72 BPM | TEMPERATURE: 99 F | WEIGHT: 274 LBS | RESPIRATION RATE: 16 BRPM

## 2020-10-10 DIAGNOSIS — L30.4 INTERTRIGO: Primary | ICD-10-CM

## 2020-10-10 PROCEDURE — 99212 OFFICE O/P EST SF 10 MIN: CPT | Mod: S$GLB,,, | Performed by: INTERNAL MEDICINE

## 2020-10-10 PROCEDURE — 99212 PR OFFICE/OUTPT VISIT, EST, LEVL II, 10-19 MIN: ICD-10-PCS | Mod: S$GLB,,, | Performed by: INTERNAL MEDICINE

## 2020-10-10 RX ORDER — CLOTRIMAZOLE AND BETAMETHASONE DIPROPIONATE 10; .64 MG/G; MG/G
CREAM TOPICAL 2 TIMES DAILY
Qty: 45 G | Refills: 0 | Status: SHIPPED | OUTPATIENT
Start: 2020-10-10 | End: 2020-10-17

## 2020-10-10 NOTE — PATIENT INSTRUCTIONS
Fungal Skin Infection   A fungal infection occurs when too much fungus grows on or in the body. Fungus normally lives on the skin in small amounts and does not cause harm. But when too much grows on the skin, it causes an infection. This is also known as tinea. Fungal skin infections are common and not usually serious.  The infection often starts as a small red area the size of a pea. The skin may turn dry and flaky. The area may itch. As the fungus grows, it spreads out in a red [place]. Because of how it looks, fungal skin infection is often called ringworm, but it is not caused by a worm. Fungal skin infections can occur on many parts of the body. They can grow on the head, chest, arms, or legs. They can occur on the buttocks. On the feet, fungal infection is known as athletes foot. It causes itchy, sometimes painful sores between the toes and the bottom or sides of the feet. In the groin, the rash is called jock itch.  People with weak immune systems can get a fungal infection more easily. This includes people with diabetes or HIV, or who are being treated for cancer. In these cases, the fungal infection can spread and cause severe illness. Fungal infections are also more common in people who are overweight.  In most cases, treatment is done with antifungal cream or ointment. If the infection is on your scalp, you may take oral medicine. In some cases, a tiny piece of the skin (biopsy) may be taken. This is so it can be tested in a lab.  Common fungal infections are treated with creams on the skin or oral medicine.  Home care  Follow all instructions when using antifungal cream or ointment on your skin. Your healthcare provider may advise using cornstarch powder to keep your skin dry or petroleum jelly to provide a barrier.  General care:  · If you were prescribed an oral medicine, read the patient information. Talk with your healthcare provider about the risks and side effects.  · Let your skin dry  completely after bathing. Carefully dry your feet and between your toes.  · Dress in loose cotton clothing.  · Dont scratch the affected area. This can delay healing and may spread the infection. It can also cause a bacterial infection.  · Keep your skin clean, but dont wash the skin too much. This can irritate your skin.  · Keep in mind that it may take a week before the fungus starts to go away. It can take 2 to 4 weeks to fully clear. To prevent it from coming back, use the medicine until the rash is all gone.  Follow-up care  Follow up with your healthcare provider if the rash does not get better after 10 days of treatment. Also follow up if the rash spreads to other parts of your body.  When to seek medical advice  Call your healthcare provider right away if any of these occur:  · Fever of 100.4°F (38°C) or higher  · Redness or swelling that gets worse  · Pain that gets worse  · Foul-smelling fluid leaking from the skin  Date Last Reviewed: 11/1/2016  © 1735-6218 [employer]. [address] [place] [address], [place] [address]. All rights reserved. This information is not intended as a substitute for professional medical care. Always follow your healthcare professional's instructions.

## 2020-10-10 NOTE — PROGRESS NOTES
"Subjective:       Patient ID: Jyotsna Moss is a 29 y.o. female.    Vitals:  height is 5' 2" (1.575 m) and weight is 124.3 kg (274 lb). Her temperature is 98.6 °F (37 °C). Her blood pressure is 127/59 (abnormal) and her pulse is 72. Her respiration is 16 and oxygen saturation is 100%.     Chief Complaint: Rash    Patient states she used a new deodorant this past Monday. On Tuesday, she noticed redness and peeling under left armpit which has slowly gotten worse since onset.    Rash  This is a new problem. The current episode started in the past 7 days (Onset Tuesday). The problem has been gradually worsening since onset. The affected locations include the right axilla. The rash is characterized by burning, dryness, pain, redness, swelling and draining (burning and stinging upon movement). She was exposed to a new detergent/soap (new deodorant day before onset). Pertinent negatives include no anorexia, congestion, cough, diarrhea, eye pain, facial edema, fatigue, fever, joint pain, nail changes, rhinorrhea, shortness of breath, sore throat or vomiting. Treatments tried: Aquaphor, Monistat, medicated powder, left it dry for a day. The treatment provided no relief. Her past medical history is significant for varicella. There is no history of allergies, asthma or eczema.       Constitution: Negative for chills, fatigue and fever.   HENT: Negative for facial swelling, congestion and sore throat.    Neck: Negative for painful lymph nodes.   Eyes: Negative for eye itching, eye pain and eyelid swelling.   Respiratory: Negative for cough and shortness of breath.    Gastrointestinal: Negative for vomiting and diarrhea.   Musculoskeletal: Negative for joint pain and joint swelling.   Skin: Positive for color change and wound. Negative for pale, rash, abrasion, laceration, lesion, skin thickening/induration, puncture wound, erythema, bruising, abscess, avulsion and hives.   Allergic/Immunologic: Negative for environmental " allergies, immunocompromised state and hives.   Hematologic/Lymphatic: Negative for swollen lymph nodes.       Objective:      Physical Exam   Skin: Skin is warm. erythema        Comments: She has a scaly, somewhat moist rash on a shiny erythematous base in her right axilla. No purulence. No axillary lymphadenopathy.         Assessment:       1. Intertrigo        Plan:         Intertrigo  -     clotrimazole-betamethasone 1-0.05% (LOTRISONE) cream; Apply topically 2 (two) times daily. for 7 days  Dispense: 45 g; Refill: 0

## 2020-10-12 ENCOUNTER — TELEPHONE (OUTPATIENT)
Dept: URGENT CARE | Facility: CLINIC | Age: 30
End: 2020-10-12

## 2021-01-04 ENCOUNTER — PATIENT MESSAGE (OUTPATIENT)
Dept: ADMINISTRATIVE | Facility: HOSPITAL | Age: 31
End: 2021-01-04

## 2021-01-28 ENCOUNTER — OFFICE VISIT (OUTPATIENT)
Dept: OBSTETRICS AND GYNECOLOGY | Facility: CLINIC | Age: 31
End: 2021-01-28
Attending: OBSTETRICS & GYNECOLOGY
Payer: MEDICAID

## 2021-01-28 VITALS
HEIGHT: 62 IN | SYSTOLIC BLOOD PRESSURE: 104 MMHG | BODY MASS INDEX: 46.64 KG/M2 | WEIGHT: 253.44 LBS | DIASTOLIC BLOOD PRESSURE: 82 MMHG

## 2021-01-28 DIAGNOSIS — Z11.3 SCREENING EXAMINATION FOR VENEREAL DISEASE: ICD-10-CM

## 2021-01-28 DIAGNOSIS — R30.0 DYSURIA: ICD-10-CM

## 2021-01-28 DIAGNOSIS — Z30.9 ENCOUNTER FOR CONTRACEPTIVE MANAGEMENT, UNSPECIFIED TYPE: ICD-10-CM

## 2021-01-28 DIAGNOSIS — Z01.419 WELL FEMALE EXAM WITH ROUTINE GYNECOLOGICAL EXAM: Primary | ICD-10-CM

## 2021-01-28 LAB
BILIRUB SERPL-MCNC: NEGATIVE MG/DL
BLOOD URINE, POC: ABNORMAL
CLARITY, POC UA: ABNORMAL
COLOR, POC UA: ABNORMAL
GLUCOSE UR QL STRIP: NEGATIVE
KETONES UR QL STRIP: NEGATIVE
LEUKOCYTE ESTERASE URINE, POC: ABNORMAL
NITRITE, POC UA: NEGATIVE
PH, POC UA: 5
PROTEIN, POC: NEGATIVE
SPECIFIC GRAVITY, POC UA: 1.01
UROBILINOGEN, POC UA: NORMAL

## 2021-01-28 PROCEDURE — 99213 OFFICE O/P EST LOW 20 MIN: CPT | Mod: PBBFAC,PN | Performed by: OBSTETRICS & GYNECOLOGY

## 2021-01-28 PROCEDURE — 87088 URINE BACTERIA CULTURE: CPT

## 2021-01-28 PROCEDURE — 88175 CYTOPATH C/V AUTO FLUID REDO: CPT

## 2021-01-28 PROCEDURE — 87186 SC STD MICRODIL/AGAR DIL: CPT

## 2021-01-28 PROCEDURE — 81002 URINALYSIS NONAUTO W/O SCOPE: CPT | Mod: PBBFAC,PN | Performed by: OBSTETRICS & GYNECOLOGY

## 2021-01-28 PROCEDURE — 99395 PR PREVENTIVE VISIT,EST,18-39: ICD-10-PCS | Mod: S$PBB,,, | Performed by: OBSTETRICS & GYNECOLOGY

## 2021-01-28 PROCEDURE — 99999 PR PBB SHADOW E&M-EST. PATIENT-LVL III: ICD-10-PCS | Mod: PBBFAC,,, | Performed by: OBSTETRICS & GYNECOLOGY

## 2021-01-28 PROCEDURE — 99395 PREV VISIT EST AGE 18-39: CPT | Mod: S$PBB,,, | Performed by: OBSTETRICS & GYNECOLOGY

## 2021-01-28 PROCEDURE — 87086 URINE CULTURE/COLONY COUNT: CPT

## 2021-01-28 PROCEDURE — 87077 CULTURE AEROBIC IDENTIFY: CPT

## 2021-01-28 PROCEDURE — 99999 PR PBB SHADOW E&M-EST. PATIENT-LVL III: CPT | Mod: PBBFAC,,, | Performed by: OBSTETRICS & GYNECOLOGY

## 2021-01-28 RX ORDER — NITROFURANTOIN 25; 75 MG/1; MG/1
100 CAPSULE ORAL 2 TIMES DAILY
Qty: 14 CAPSULE | Refills: 0 | Status: SHIPPED | OUTPATIENT
Start: 2021-01-28 | End: 2021-02-04

## 2021-01-28 RX ORDER — NORGESTIMATE AND ETHINYL ESTRADIOL 0.25-0.035
1 KIT ORAL DAILY
Qty: 28 TABLET | Refills: 11 | Status: SHIPPED | OUTPATIENT
Start: 2021-01-28 | End: 2022-02-15

## 2021-01-31 LAB
BACTERIA UR CULT: ABNORMAL
C TRACH RRNA SPEC QL NAA+PROBE: NEGATIVE
N GONORRHOEA RRNA SPEC QL NAA+PROBE: NEGATIVE

## 2021-02-04 ENCOUNTER — PATIENT MESSAGE (OUTPATIENT)
Dept: OBSTETRICS AND GYNECOLOGY | Facility: CLINIC | Age: 31
End: 2021-02-04

## 2021-02-04 DIAGNOSIS — R30.0 DYSURIA: Primary | ICD-10-CM

## 2021-02-05 ENCOUNTER — LAB VISIT (OUTPATIENT)
Dept: LAB | Facility: HOSPITAL | Age: 31
End: 2021-02-05
Attending: OBSTETRICS & GYNECOLOGY
Payer: MEDICAID

## 2021-02-05 DIAGNOSIS — R30.0 DYSURIA: ICD-10-CM

## 2021-02-05 PROCEDURE — 87086 URINE CULTURE/COLONY COUNT: CPT

## 2021-02-07 LAB — BACTERIA UR CULT: NO GROWTH

## 2021-02-14 LAB
FINAL PATHOLOGIC DIAGNOSIS: NORMAL
Lab: NORMAL

## 2021-04-05 ENCOUNTER — PATIENT MESSAGE (OUTPATIENT)
Dept: ADMINISTRATIVE | Facility: HOSPITAL | Age: 31
End: 2021-04-05

## 2021-05-19 ENCOUNTER — OFFICE VISIT (OUTPATIENT)
Dept: URGENT CARE | Facility: CLINIC | Age: 31
End: 2021-05-19
Payer: MEDICAID

## 2021-05-19 VITALS
OXYGEN SATURATION: 97 % | WEIGHT: 250 LBS | SYSTOLIC BLOOD PRESSURE: 108 MMHG | TEMPERATURE: 99 F | BODY MASS INDEX: 46.01 KG/M2 | RESPIRATION RATE: 18 BRPM | HEIGHT: 62 IN | HEART RATE: 87 BPM | DIASTOLIC BLOOD PRESSURE: 62 MMHG

## 2021-05-19 DIAGNOSIS — R21 RASH AND NONSPECIFIC SKIN ERUPTION: Primary | ICD-10-CM

## 2021-05-19 PROCEDURE — 99213 OFFICE O/P EST LOW 20 MIN: CPT | Mod: S$GLB,,, | Performed by: STUDENT IN AN ORGANIZED HEALTH CARE EDUCATION/TRAINING PROGRAM

## 2021-05-19 PROCEDURE — 99213 PR OFFICE/OUTPT VISIT, EST, LEVL III, 20-29 MIN: ICD-10-PCS | Mod: S$GLB,,, | Performed by: STUDENT IN AN ORGANIZED HEALTH CARE EDUCATION/TRAINING PROGRAM

## 2021-05-19 RX ORDER — TRIAMCINOLONE ACETONIDE 1 MG/G
OINTMENT TOPICAL 2 TIMES DAILY
Qty: 15 G | Refills: 0 | Status: SHIPPED | OUTPATIENT
Start: 2021-05-19 | End: 2021-05-26

## 2021-05-19 RX ORDER — SUMATRIPTAN 50 MG/1
50 TABLET, FILM COATED ORAL DAILY PRN
COMMUNITY
Start: 2021-04-27

## 2021-05-22 ENCOUNTER — TELEPHONE (OUTPATIENT)
Dept: URGENT CARE | Facility: CLINIC | Age: 31
End: 2021-05-22